# Patient Record
Sex: FEMALE | Race: WHITE | Employment: FULL TIME | URBAN - METROPOLITAN AREA
[De-identification: names, ages, dates, MRNs, and addresses within clinical notes are randomized per-mention and may not be internally consistent; named-entity substitution may affect disease eponyms.]

---

## 2020-09-10 ENCOUNTER — OFFICE VISIT (OUTPATIENT)
Dept: OBGYN CLINIC | Age: 30
End: 2020-09-10
Payer: COMMERCIAL

## 2020-09-10 VITALS
DIASTOLIC BLOOD PRESSURE: 79 MMHG | WEIGHT: 237 LBS | BODY MASS INDEX: 33.93 KG/M2 | SYSTOLIC BLOOD PRESSURE: 123 MMHG | HEIGHT: 70 IN

## 2020-09-10 DIAGNOSIS — Z01.419 ENCNTR FOR GYN EXAM (GENERAL) (ROUTINE) W/O ABN FINDINGS: Primary | ICD-10-CM

## 2020-09-10 DIAGNOSIS — Z11.51 SCREENING FOR HPV (HUMAN PAPILLOMAVIRUS): ICD-10-CM

## 2020-09-10 PROCEDURE — 99385 PREV VISIT NEW AGE 18-39: CPT | Performed by: OBSTETRICS & GYNECOLOGY

## 2020-09-10 NOTE — PROGRESS NOTES
Alexis Siddiqui is a [de-identified] ,  26 y.o. female [facility] whose No LMP recorded. (Menstrual status: IUD). was on  who presents for her annual checkup. She is having no significant problems. She would like STD testing today, denies symptoms. With regard to the Gardisil vaccine, she is older than the FDA approved age to receive it. Menstrual status:    Her periods are minimal to nonexistent in flow. She is using essentially none pads or tampons per day, minimal to none using Mirena. She denies dysmenorrhea. She reports no premenstrual symptoms. Contraception:    The current method of family planning is IUD and She declines contraception and counseling. Sexual history:    She  reports being sexually active and has had partner(s) who are Male. She reports using the following method of birth control/protection: I.U.D..    Medical conditions:    Since her last annual GYN exam about 3/21/2016 ago, she has not the following changes in her health history: none. Pap and Mammogram History:    Her most recent Pap smear was normal obtained 3/21/2016 year(s) ago. Hx of Leep 2009    The patient has never had a mammogram.    The patient does not have a family history of breast cancer. Past Medical History:   Diagnosis Date    Abnormal Pap smear of cervix 2009    + HPV and mild dysplasia. Tx w/ LEEP. q 6month pap x2 both negative.  Encounter for insertion of mirena IUD 04/08/2016 4/2011;Replaced 4/8/16    HPV vaccine counseling     Gardasil Series Completed     Routine Papanicolaou smear 3/21/16    Negative (no hpv)      Past Surgical History:   Procedure Laterality Date    HX GYN  4/2011    Mirena IUD Inserted     HX LEEP PROCEDURE  Summer 2009    Mild dysplasia and HPV +       Current Outpatient Medications   Medication Sig Dispense Refill    levonorgestrel (MIRENA) 20 mcg/24 hr (5 years) IUD 1 Each by IntraUTERine route once. Allergies: Patient has no known allergies. Social History     Socioeconomic History    Marital status:      Spouse name: Not on file    Number of children: Not on file    Years of education: Not on file    Highest education level: Not on file   Occupational History    Not on file   Social Needs    Financial resource strain: Not on file    Food insecurity     Worry: Not on file     Inability: Not on file    Transportation needs     Medical: Not on file     Non-medical: Not on file   Tobacco Use    Smoking status: Current Every Day Smoker     Packs/day: 0.50     Types: Cigarettes    Smokeless tobacco: Never Used   Substance and Sexual Activity    Alcohol use: Yes     Alcohol/week: 7.0 standard drinks     Types: 7 Standard drinks or equivalent per week     Comment: Drinks 1 drink w/dinner. And socially.  Drug use: No    Sexual activity: Yes     Partners: Male     Birth control/protection: I.U.D. Lifestyle    Physical activity     Days per week: Not on file     Minutes per session: Not on file    Stress: Not on file   Relationships    Social connections     Talks on phone: Not on file     Gets together: Not on file     Attends [employer] service: Not on file     Active member of club or organization: Not on file     Attends meetings of clubs or organizations: Not on file     Relationship status: Not on file    Intimate partner violence     Fear of current or ex partner: Not on file     Emotionally abused: Not on file     Physically abused: Not on file     Forced sexual activity: Not on file   Other Topics Concern    Not on file   Social History Narrative    Not on file     Tobacco History:  reports that she has been smoking cigarettes. She has been smoking about 0.50 packs per day. She has never used smokeless tobacco.  Alcohol Abuse:  reports current alcohol use of about 7.0 standard drinks of alcohol per week. Drug Abuse:  reports no history of drug use. There is no problem list on file for this patient.       Review of Systems - History obtained from the patient  Constitutional: negative for weight loss, fever, night sweats  HEENT: negative for hearing loss, earache, congestion, snoring, sorethroat  CV: negative for chest pain, palpitations, edema  Resp: negative for cough, shortness of breath, wheezing  GI: negative for change in bowel habits, abdominal pain, black or bloody stools  : negative for frequency, dysuria, hematuria, vaginal discharge  MSK: negative for back pain, joint pain, muscle pain  Breast: negative for breast lumps, nipple discharge, galactorrhea  Skin :negative for itching, rash, hives  Neuro: negative for dizziness, headache, confusion, weakness  Psych: negative for anxiety, depression, change in mood  Heme/lymph: negative for bleeding, bruising, pallor    Physical Exam    There were no vitals taken for this visit.     Constitutional  · Appearance: well-nourished, well developed, alert, in no acute distress    HENT  · Head and Face: appears normal    Neck  · Inspection/Palpation: normal appearance, no masses or tenderness  · Lymph Nodes: no lymphadenopathy present  · Thyroid: gland size normal, nontender, no nodules or masses present on palpation    Chest  · Respiratory Effort: breathing normal  · Auscultation: normal breath sounds    Cardiovascular  · Heart:  · Auscultation: regular rate and rhythm without murmur    Breasts  · Inspection of Breasts: breasts symmetrical, no skin changes, no discharge present, nipple appearance normal, no skin retraction present  · Palpation of Breasts and Axillae: no masses present on palpation, no breast tenderness  · Axillary Lymph Nodes: no lymphadenopathy present    Gastrointestinal  · Abdominal Examination: abdomen non-tender to palpation, normal bowel sounds, no masses present  · Liver and spleen: no hepatomegaly present, spleen not palpable  · Hernias: no hernias identified    Genitourinary  · External Genitalia: normal appearance for age, no discharge present, no tenderness present, no inflammatory lesions present, no masses present, no atrophy present  · Vagina: normal vaginal vault without central or paravaginal defects, no discharge present, no inflammatory lesions present, no masses present  · Bladder: non-tender to palpation  · Urethra: appears normal  · Cervix: normal, string seen   · Uterus: normal size, shape and consistency  · Adnexa: no adnexal tenderness present, no adnexal masses present  · Perineum: perineum within normal limits, no evidence of trauma, no rashes or skin lesions present  · Anus: anus within normal limits, no hemorrhoids present  · Inguinal Lymph Nodes: no lymphadenopathy present    Skin  · General Inspection: no rash, no lesions identified    Neurologic/Psychiatric  · Mental Status:  · Orientation: grossly oriented to person, place and time  · Mood and Affect: mood normal, affect appropriate    . Assessment:  Routine gynecologic examination  Her current medical status is satisfactory with no evidence of significant gynecologic issues.     Plan:  Counseled re: diet, exercise, healthy lifestyle  Return for yearly wellness visits  Pt counseled regarding co-testing for high risk HPV with pap  Switch out IUD next spring wants another Mirena  GCC, blood STD testing

## 2020-09-10 NOTE — PATIENT INSTRUCTIONS
Well Visit, Ages 25 to 48: Care Instructions Your Care Instructions Physical exams can help you stay healthy. Your doctor has checked your overall health and may have suggested ways to take good care of yourself. He or she also may have recommended tests. At home, you can help prevent illness with healthy eating, regular exercise, and other steps. Follow-up care is a key part of your treatment and safety. Be sure to make and go to all appointments, and call your doctor if you are having problems. It's also a good idea to know your test results and keep a list of the medicines you take. How can you care for yourself at home? · Reach and stay at a healthy weight. This will lower your risk for many problems, such as obesity, diabetes, heart disease, and high blood pressure. · Get at least 30 minutes of physical activity on most days of the week. Walking is a good choice. You also may want to do other activities, such as running, swimming, cycling, or playing tennis or team sports. Discuss any changes in your exercise program with your doctor. · Do not smoke or allow others to smoke around you. If you need help quitting, talk to your doctor about stop-smoking programs and medicines. These can increase your chances of quitting for good. · Talk to your doctor about whether you have any risk factors for sexually transmitted infections (STIs). Having one sex partner (who does not have STIs and does not have sex with anyone else) is a good way to avoid these infections. · Use birth control if you do not want to have children at this time. Talk with your doctor about the choices available and what might be best for you. · Protect your skin from too much sun. When you're outdoors from 10 a.m. to 4 p.m., stay in the shade or cover up with clothing and a hat with a wide brim. Wear sunglasses that block UV rays. Even when it's cloudy, put broad-spectrum sunscreen (SPF 30 or higher) on any exposed skin. · See a dentist one or two times a year for checkups and to have your teeth cleaned. · Wear a seat belt in the car. Follow your doctor's advice about when to have certain tests. These tests can spot problems early. For everyone · Cholesterol. Have the fat (cholesterol) in your blood tested after age 21. Your doctor will tell you how often to have this done based on your age, family history, or other things that can increase your risk for heart disease. · Blood pressure. Have your blood pressure checked during a routine doctor visit. Your doctor will tell you how often to check your blood pressure based on your age, your blood pressure results, and other factors. · Vision. Talk with your doctor about how often to have a glaucoma test. 
· Diabetes. Ask your doctor whether you should have tests for diabetes. · Colon cancer. Your risk for colorectal cancer gets higher as you get older. Some experts say that adults should start regular screening at age 48 and stop at age 76. Others say to start before age 48 or continue after age 76. Talk with your doctor about your risk and when to start and stop screening. For women · Breast exam and mammogram. Talk to your doctor about when you should have a clinical breast exam and a mammogram. Medical experts differ on whether and how often women under 50 should have these tests. Your doctor can help you decide what is right for you. · Cervical cancer screening test and pelvic exam. Begin with a Pap test at age 24. The test often is part of a pelvic exam. Starting at age 27, you may choose to have a Pap test, an HPV test, or both tests at the same time (called co-testing). Talk with your doctor about how often to have testing. · Tests for sexually transmitted infections (STIs). Ask whether you should have tests for STIs. You may be at risk if you have sex with more than one person, especially if your partners do not wear condoms. For men · Tests for sexually transmitted infections (STIs). Ask whether you should have tests for STIs. You may be at risk if you have sex with more than one person, especially if you do not wear a condom. · Testicular cancer exam. Ask your doctor whether you should check your testicles regularly. · Prostate exam. Talk to your doctor about whether you should have a blood test (called a PSA test) for prostate cancer. Experts differ on whether and when men should have this test. Some experts suggest it if you are older than 39 and are -American or have a father or brother who got prostate cancer when he was younger than 72. When should you call for help? Watch closely for changes in your health, and be sure to contact your doctor if you have any problems or symptoms that concern you. Where can you learn more? Go to http://cheikh-ju.info/ Enter P072 in the search box to learn more about \"Well Visit, Ages 25 to 48: Care Instructions. \" Current as of: May 27, 2020               Content Version: 12.6 © 7402-2587 [employer]. Care instructions adapted under license by [employer] (which disclaims liability or warranty for this information). If you have questions about a medical condition or this instruction, always ask your healthcare professional. [address] any warranty or liability for your use of this information.

## 2020-09-10 NOTE — PROGRESS NOTES
Anayeli Nazario is a [de-identified] ,  26 y.o. female [facility] [place] whose No LMP recorded. (Menstrual status: IUD). was on  who presents for her annual checkup. She is having {problem:53930}. With regard to the Gardisil vaccine, she is older than the FDA approved age to receive it. Menstrual status: 
 
Her periods are {Description; bleeding vaginal:53658::\"minimal to nonexistent\"} in flow. She is using {Number Text:81320} pads or tampons per day, {cycle:74453}. She denies dysmenorrhea. She reports no premenstrual symptoms. Contraception: The current method of family planning is {contraception:170855::\"She declines contraception and counseling\"}. Sexual history: She  reports being sexually active and has had partner(s) who are Male. She reports using the following method of birth control/protection: I.U.D.. Medical conditions: 
 
Since her last annual GYN exam about three or more years ago, she has not the following changes in her health history: none. Pap and Mammogram History: 
 
Her most recent Pap smear was 3/21/2016 neg The patient has never had a mammogram. 
 
The patient does not have a family history of breast cancer. Past Medical History:  
Diagnosis Date  Abnormal Pap smear of cervix 2009  
 + HPV and mild dysplasia. Tx w/ LEEP. q 6month pap x2 both negative.  Encounter for insertion of mirena IUD Inserted 4/2011 Replaced 4/8/16  HPV vaccine counseling Gardasil Series Completed  Routine Papanicolaou smear 3/21/16 Negative (no hpv) Past Surgical History:  
Procedure Laterality Date  HX GYN  4/2011 Mirena IUD Inserted  HX LEEP PROCEDURE  Summer 2009 Mild dysplasia and HPV + Current Outpatient Medications Medication Sig Dispense Refill  levonorgestrel (MIRENA) 20 mcg/24 hr (5 years) IUD 1 Each by IntraUTERine route once. Allergies: Patient has no known allergies. Social History Socioeconomic History  Marital status:  Spouse name: Not on file  Number of children: Not on file  Years of education: Not on file  Highest education level: Not on file Occupational History  Not on file Social Needs  Financial resource strain: Not on file  Food insecurity Worry: Not on file Inability: Not on file  Transportation needs Medical: Not on file Non-medical: Not on file Tobacco Use  Smoking status: Current Every Day Smoker Packs/day: 0.50 Types: Cigarettes  Smokeless tobacco: Never Used Substance and Sexual Activity  Alcohol use: Yes Alcohol/week: 7.0 standard drinks Types: 7 Standard drinks or equivalent per week Comment: Drinks 1 drink w/dinner. And socially.  Drug use: No  
 Sexual activity: Yes  
  Partners: Male Birth control/protection: I.U.D. Lifestyle  Physical activity Days per week: Not on file Minutes per session: Not on file  Stress: Not on file Relationships  Social connections Talks on phone: Not on file Gets together: Not on file Attends Caodaism service: Not on file Active member of club or organization: Not on file Attends meetings of clubs or organizations: Not on file Relationship status: Not on file  Intimate partner violence Fear of current or ex partner: Not on file Emotionally abused: Not on file Physically abused: Not on file Forced sexual activity: Not on file Other Topics Concern  Not on file Social History Narrative  Not on file Tobacco History:  reports that she has been smoking cigarettes. She has been smoking about 0.50 packs per day. She has never used smokeless tobacco. 
Alcohol Abuse:  reports current alcohol use of about 7.0 standard drinks of alcohol per week. Drug Abuse:  reports no history of drug use. There is no problem list on file for this patient. Review of Systems - History obtained from the patient Constitutional: negative for weight loss, fever, night sweats HEENT: negative for hearing loss, earache, congestion, snoring, sorethroat CV: negative for chest pain, palpitations, edema Resp: negative for cough, shortness of breath, wheezing GI: negative for change in bowel habits, abdominal pain, black or bloody stools : negative for frequency, dysuria, hematuria, vaginal discharge MSK: negative for back pain, joint pain, muscle pain Breast: negative for breast lumps, nipple discharge, galactorrhea Skin :negative for itching, rash, hives Neuro: negative for dizziness, headache, confusion, weakness Psych: negative for anxiety, depression, change in mood Heme/lymph: negative for bleeding, bruising, pallor Physical Exam 
 
There were no vitals taken for this visit. Constitutional 
· Appearance: well-nourished, well developed, alert, in no acute distress HENT 
· Head and Face: appears normal 
 
Neck · Inspection/Palpation: normal appearance, no masses or tenderness · Lymph Nodes: no lymphadenopathy present · Thyroid: gland size normal, nontender, no nodules or masses present on palpation Chest 
· Respiratory Effort: breathing normal 
· Auscultation: normal breath sounds Cardiovascular · Heart: 
· Auscultation: regular rate and rhythm without murmur Breasts · Inspection of Breasts: breasts symmetrical, no skin changes, no discharge present, nipple appearance normal, no skin retraction present · Palpation of Breasts and Axillae: no masses present on palpation, no breast tenderness · Axillary Lymph Nodes: no lymphadenopathy present Gastrointestinal 
· Abdominal Examination: abdomen non-tender to palpation, normal bowel sounds, no masses present · Liver and spleen: no hepatomegaly present, spleen not palpable · Hernias: no hernias identified Genitourinary · External Genitalia: normal appearance for age, no discharge present, no tenderness present, no inflammatory lesions present, no masses present, no atrophy present · Vagina: normal vaginal vault without central or paravaginal defects, no discharge present, no inflammatory lesions present, no masses present · Bladder: non-tender to palpation · Urethra: appears normal 
· Cervix: normal  
· Uterus: normal size, shape and consistency · Adnexa: no adnexal tenderness present, no adnexal masses present · Perineum: perineum within normal limits, no evidence of trauma, no rashes or skin lesions present · Anus: anus within normal limits, no hemorrhoids present · Inguinal Lymph Nodes: no lymphadenopathy present Skin · General Inspection: no rash, no lesions identified Neurologic/Psychiatric · Mental Status: · Orientation: grossly oriented to person, place and time · Mood and Affect: mood normal, affect appropriate [facility]: 
Routine gynecologic examination Her current medical status is satisfactory with no evidence of significant gynecologic issues. Plan: 
Counseled re: diet, exercise, healthy lifestyle Return for yearly wellness visits Gardisil counseling provided Pt counseled regarding co-testing for high risk HPV with pap Rec screening mammo

## 2020-09-12 LAB
COMMENT, 144067: NORMAL
HBV SURFACE AG SERPL QL IA: NEGATIVE
HCV AB S/CO SERPL IA: <0.1 S/CO RATIO (ref 0–0.9)
HIV 1+2 AB+HIV1 P24 AG SERPL QL IA: NON REACTIVE
HSV2 IGG SER IA-ACNC: <0.91 INDEX (ref 0–0.9)
TREPONEMA PALLIDUM IGG+IGM AB [PRESENCE] IN SERUM OR PLASMA BY IMMUNOASSAY: NON REACTIVE

## 2020-09-13 LAB
C TRACH RRNA SPEC QL NAA+PROBE: NEGATIVE
N GONORRHOEA RRNA SPEC QL NAA+PROBE: NEGATIVE
T VAGINALIS DNA SPEC QL NAA+PROBE: NEGATIVE

## 2020-09-25 LAB
CYTOLOGIST CVX/VAG CYTO: NORMAL
CYTOLOGY CVX/VAG DOC CYTO: NORMAL
CYTOLOGY CVX/VAG DOC THIN PREP: NORMAL
CYTOLOGY HISTORY:: NORMAL
DX ICD CODE: NORMAL
HPV I/H RISK 1 DNA CVX QL PROBE+SIG AMP: NEGATIVE
Lab: NORMAL
OTHER STN SPEC: NORMAL
STAT OF ADQ CVX/VAG CYTO-IMP: NORMAL

## 2021-09-13 ENCOUNTER — OFFICE VISIT (OUTPATIENT)
Dept: OBGYN CLINIC | Age: 31
End: 2021-09-13
Payer: COMMERCIAL

## 2021-09-13 VITALS
BODY MASS INDEX: 29.56 KG/M2 | WEIGHT: 206 LBS | SYSTOLIC BLOOD PRESSURE: 117 MMHG | HEART RATE: 58 BPM | DIASTOLIC BLOOD PRESSURE: 67 MMHG

## 2021-09-13 DIAGNOSIS — Z01.419 ENCOUNTER FOR GYNECOLOGICAL EXAMINATION: Primary | ICD-10-CM

## 2021-09-13 PROCEDURE — 99395 PREV VISIT EST AGE 18-39: CPT | Performed by: OBSTETRICS & GYNECOLOGY

## 2021-09-13 NOTE — PROGRESS NOTES
lAnnual exam ages 21-44    [address] is a ,  33 y.o. female WHITE/NON- No LMP recorded. (Menstrual status: IUD). , who presents for her annual checkup. Mirena IUD replaced 16. Saw Dr. Tracey Trinh . Last saw me in . Bought a horse. Since her last annual GYN exam about one year ago, she has had the following changes in her health history:   - has lost about 50# from last year. Eating better, exercising    Will probably try to get pregnant in next 2yrs. ADDITIONAL CONCERNS:  She is having no significant problems. She would like to discuss getting her Mirena IUD replaced; inserted 2016. With regard to the Gardasil vaccine, she has not received it yet. Menstrual status:    Her periods are minimal to none in flow with IUD. She denies dysmenorrhea. She has premenstrual symptoms; bloating. Contraception:    The current method of family planning is IUD. Sexual history:     She  reports being sexually active and has had partner(s) who are Male. She reports using the following method of birth control/protection: I.U.D.. [facility] Pap and Mammogram History:    Her most recent Pap smear was normal, HPV was negative, obtained 2020. She does have a history of abnormal pap in . The patient has never had a mammogram.    Breast Cancer History/Substance Abuse:    Past Medical History:   Diagnosis Date    Abnormal Pap smear of cervix     + HPV and mild dysplasia. Tx w/ LEEP. q 6month pap x2 both negative.     Encounter for insertion of mirena IUD 2016;Replaced 16    HPV vaccine counseling     Gardasil Series Completed     Routine Papanicolaou smear 3/21/16    Negative (no hpv)      Past Surgical History:   Procedure Laterality Date    HX GYN  2011    Mirena IUD Inserted     HX LEEP PROCEDURE  Summer 2009    Mild dysplasia and HPV +     OB History    Para Term  AB Living   0 0 0 0 0 0   SAB TAB Ectopic Molar Multiple Live Births   0 0 0   0         Current Outpatient Medications   Medication Sig Dispense Refill    levonorgestrel (MIRENA) 20 mcg/24 hr (5 years) IUD 1 Each by IntraUTERine route once. Allergies: Patient has no known allergies. Social History     Socioeconomic History    Marital status:      Spouse name: Not on file    Number of children: Not on file    Years of education: Not on file    Highest education level: Not on file   Occupational History    Not on file   Tobacco Use    Smoking status: Former Smoker     Packs/day: 0.00     Types: Cigarettes    Smokeless tobacco: Never Used   Vaping Use    Vaping Use: Every day    Substances: Nicotine   Substance and Sexual Activity    Alcohol use: Yes     Alcohol/week: 7.0 standard drinks     Types: 7 Standard drinks or equivalent per week     Comment: Drinks 1 drink w/dinner. And socially.  Drug use: Yes     Types: Marijuana    Sexual activity: Yes     Partners: Male     Birth control/protection: I.U.D. Other Topics Concern    Not on file   Social History Narrative    Not on file     Social Determinants of Health     Financial Resource Strain:     Difficulty of Paying Living Expenses:    Food Insecurity:     Worried About Running Out of Food in the Last Year:     [address] in the Last Year:    Transportation Needs:     Lack of Transportation (Medical):      Lack of Transportation (Non-Medical):    Physical Activity:     Days of Exercise per Week:     Minutes of Exercise per Session:    Stress:     Feeling of Stress :    Social Connections:     Frequency of Communication with Friends and Family:     Frequency of Social Gatherings with Friends and Family:     Attends Alevism Services:     Active Member of Clubs or Organizations:     Attends Club or Organization Meetings:     Marital Status:    Intimate Partner Violence:     Fear of Current or Ex-Partner:     Emotionally Abused:     Physically Abused:     Sexually Abused: Tobacco History:  reports that she has quit smoking. Her smoking use included cigarettes. She smoked 0.00 packs per day. She has never used smokeless tobacco.  Alcohol Abuse:  reports current alcohol use of about 7.0 standard drinks of alcohol per week. Drug Abuse:  reports current drug use. Drug: Marijuana. There is no problem list on file for this patient.     Family History   Problem Relation Age of Onset    Diabetes Father     Hypertension Father     Heart Attack Father         Fatal        Review of Systems - History obtained from the patient  Constitutional: negative for weight loss, fever, night sweats  HEENT: negative for hearing loss, earache, congestion, snoring, sorethroat  CV: negative for chest pain, palpitations, edema  Resp: negative for cough, shortness of breath, wheezing  GI: negative for change in bowel habits, abdominal pain, black or bloody stools  : negative for frequency, dysuria, hematuria, vaginal discharge  MSK: negative for back pain, joint pain, muscle pain  Breast: negative for breast lumps, nipple discharge, galactorrhea  Skin :negative for itching, rash, hives  Neuro: negative for dizziness, headache, confusion, weakness  Psych: negative for anxiety, depression, change in mood  Heme/lymph: negative for bleeding, bruising, pallor    Physical Exam    Visit Vitals  /67   Pulse (!) 58   Wt 206 lb (93.4 kg)   BMI 29.56 kg/m²       Constitutional  · Appearance: well-nourished, well developed, alert, in no acute distress    HENT  · Head and Face: appears normal    Neck  · Inspection/Palpation: normal appearance, no masses or tenderness  · Lymph Nodes: no lymphadenopathy present  · Thyroid: gland size normal, nontender, no nodules or masses present on palpation    Chest  · Respiratory Effort: breathing unlabored  · Auscultation: normal breath sounds    Cardiovascular  · Heart:  · Auscultation: regular rate and rhythm without murmur    Breasts  · Inspection of Breasts: breasts symmetrical, no skin changes, no discharge present, nipple appearance normal, no skin retraction present  · Palpation of Breasts and Axillae: no masses present on palpation, no breast tenderness  · Axillary Lymph Nodes: no lymphadenopathy present    Gastrointestinal  · Abdominal Examination: abdomen non-tender to palpation, normal bowel sounds, no masses present  · Liver and spleen: no hepatomegaly present, spleen not palpable  · Hernias: no hernias identified    Genitourinary  · External Genitalia: normal appearance for age, no discharge present, no tenderness present, no inflammatory lesions present, no masses present, no atrophy present  · Vagina: normal vaginal vault without central or paravaginal defects, no discharge present, no inflammatory lesions present, no masses present  · Bladder: non-tender to palpation  · Urethra: appears normal  · Cervix: normal; IUD strings ~3cm  · Uterus: normal size, shape and consistency  · Adnexa: no adnexal tenderness present, no adnexal masses present  · Perineum: perineum within normal limits, no evidence of trauma, no rashes or skin lesions present  · Anus: anus within normal limits, no hemorrhoids present  · Inguinal Lymph Nodes: no lymphadenopathy present    Skin  · General Inspection: no rash, no lesions identified    Neurologic/Psychiatric  · Mental Status:  · Orientation: grossly oriented to person, place and time  · Mood and Affect: mood normal, affect appropriate            Assessment & Plan:  · Routine gynecologic examination. Pap/HPV neg 9/10/20  · Remote h/o abnl pap, tx'd with LEEP. Has returned to routine screening. · Her current medical status is satisfactory with no evidence of significant gynecologic issues. · Mirena IUD 4/8/16. Adv now approved for 7yrs. · May consider pregnancy in next couple of years. Preconceptual counseling. Rec MVI/PNV/folate/DHA. Healthy life style, avoid T/E/D. Up to date with vaccinations.   Address any dental issues prior to pregnancy.   · Counseled re: diet, exercise, healthy lifestyle  · Return for yearly wellness visits  · Pt counseled regarding co-testing for high risk HPV with pap  · Patient verbalized understanding

## 2022-04-27 NOTE — PROGRESS NOTES
[facility]  OFFICE PROCEDURE PROGRESS NOTE        Chart reviewed for the following:   Miles BISWAS RN, have reviewed the History, Physical and updated the Allergic reactions for Judson Mccall performed immediately prior to start of procedure:   Les Patterson RN, have performed the following reviews on Glorious Handing prior to the start of the procedure:            * Patient was identified by name and date of birth   * Agreement on procedure being performed was verified  * Risks and Benefits explained to the patient  * Procedure site verified and marked as necessary  * Patient was positioned for comfort  * Consent was signed and verified     Time:       Date of procedure: 2022    Procedure performed by:  Isabel Cain MD    Provider assisted by: Michel Kelsey RN    Patient assisted by: self    How tolerated by patient: tolerated the procedure well with no complications    Post Procedural Pain Scale: 2 - Hurts Little Bit    Comments: none      IUD REMOVAL    Indications for Removal:  Judson Burrell is a ,  33 y.o. female [address] [facility] [place] whose No LMP recorded. (Menstrual status: IUD). was on . who presents today for IUD removal.  Her current IUD was placed 16. She has not had any problems with the IUD. She requests removal of the IUD because she would like to get pregnant. Did have heavy cycles prior to IUD. Advised that she will likely return to this. The IUD removal procedure was discussed with the patient and she had no further questions. Procedure: The patient was placed in a dorsal lithotomy position. A speculum exam was performed and the cervix was visualized. The cervix was prepped with zephiran solution. The IUD string was visualized. Using ring forceps , the string was grasped and the IUD removed intact. The IUD was shown to the patient.      Is taking PNV, advised to continue  Has seen dentist and had necessary work done  Advised to see PCP make sure UTD with routine care/vaccines  Menstrual calendar given. Briefly discussed OPK.

## 2022-04-28 ENCOUNTER — OFFICE VISIT (OUTPATIENT)
Dept: OBGYN CLINIC | Age: 32
End: 2022-04-28
Payer: COMMERCIAL

## 2022-04-28 VITALS
BODY MASS INDEX: 29.29 KG/M2 | SYSTOLIC BLOOD PRESSURE: 113 MMHG | DIASTOLIC BLOOD PRESSURE: 76 MMHG | HEIGHT: 70 IN | WEIGHT: 204.6 LBS

## 2022-04-28 DIAGNOSIS — Z30.432 ENCOUNTER FOR IUD REMOVAL: Primary | ICD-10-CM

## 2022-04-28 PROCEDURE — 58301 REMOVE INTRAUTERINE DEVICE: CPT | Performed by: OBSTETRICS & GYNECOLOGY

## 2022-09-16 NOTE — PROGRESS NOTES
Annual exam ages 21-44    [address] is a ,  27 y.o. female [address] [facility] [place] Patient's last menstrual period was 2022., who presents for her annual checkup. Since her last annual GYN exam about one year ago, she has had the following changes in her health history:   - Mirena removed     Trying to get pregnant. Taking PNV. Saw PCP earlier in the year. Had titers check, got MMR. Up to date with dental care. Periods regular, but ?shorter luteal phase. Has been tracking cycles. Reports ovulation ~CD#15-16, luteal phase usually about 10 days. ADDITIONAL CONCERNS:  She is having no significant problems. With regard to the Gardasil vaccine, she has received all 3 injections. Menstrual status:    Her periods are light in flow. She is using one to two pads or tampons per day, usually regular and last 25-27 days. She denies dysmenorrhea. She denies premenstrual symptoms. Contraception:    The current method of family planning is none. Sexual history:     She  has no history on file for sexual activity. .        Pap and Mammogram History:    Her most recent Pap smear was normal, HPV was neg, obtained 2020. She does have a history of abnormal paps. Pap    + HPV and mild dysplasia. Tx w/ LEEP. The patient has never had a mammogram.    Breast Cancer History/Substance Abuse:    Past Medical History:   Diagnosis Date    Abnormal Pap smear of cervix 2009    + HPV and mild dysplasia. Tx w/ LEEP. q 6month pap x2 both negative. Encounter for insertion of mirena IUD 2016;Replaced 14 -> removed 22.     HPV vaccine counseling     Gardasil Series Completed     Routine Papanicolaou smear 3/21/16    Negative (no hpv)      Past Surgical History:   Procedure Laterality Date    HX GYN  2011    Mirena IUD Inserted     HX LEEP PROCEDURE  Summer 2009    Mild dysplasia and HPV +     OB History    Para Term  AB Living   0 0 0 0 0 0 SAB IAB Ectopic Molar Multiple Live Births   0 0 0   0         Current Outpatient Medications   Medication Sig Dispense Refill    prenatal multivit-ca-min-fe-fa tab Take  by mouth.      loratadine 10 mg cap Take  by mouth. Allergies: Patient has no known allergies. Social History     Socioeconomic History    Marital status:      Spouse name: Not on file    Number of children: Not on file    Years of education: Not on file    Highest education level: Not on file   Occupational History    Not on file   Tobacco Use    Smoking status: Former     Packs/day: 0.00     Types: Cigarettes    Smokeless tobacco: Never   Vaping Use    Vaping Use: Former    Substances: Nicotine   Substance and Sexual Activity    Alcohol use: Yes     Alcohol/week: 7.0 standard drinks     Types: 7 Standard drinks or equivalent per week     Comment: Drinks 1 drink w/dinner. And socially. Drug use: Not Currently     Types: Marijuana    Sexual activity: Not on file   Other Topics Concern    Not on file   Social History Narrative    Not on file     Social Determinants of Health     Financial Resource Strain: Not on file   Food Insecurity: Not on file   Transportation Needs: Not on file   Physical Activity: Not on file   Stress: Not on file   Social Connections: Not on file   Intimate Partner Violence: Not on file   Housing Stability: Not on file     Tobacco History:  reports that she has quit smoking. Her smoking use included cigarettes. She has never used smokeless tobacco.  Alcohol Abuse:  reports current alcohol use of about 7.0 standard drinks per week. Drug Abuse:  reports that she does not currently use drugs after having used the following drugs: Marijuana. There is no problem list on file for this patient.     Family History   Problem Relation Age of Onset    Diabetes Father     Hypertension Father     Heart Attack Father         Fatal        Review of Systems - History obtained from the patient  Constitutional: negative for weight loss, fever, night sweats  HEENT: negative for hearing loss, earache, congestion, snoring, sorethroat  CV: negative for chest pain, palpitations, edema  Resp: negative for cough, shortness of breath, wheezing  GI: negative for change in bowel habits, abdominal pain, black or bloody stools  : negative for frequency, dysuria, hematuria, vaginal discharge  MSK: negative for back pain, joint pain, muscle pain  Breast: negative for breast lumps, nipple discharge, galactorrhea  Skin :negative for itching, rash, hives  Neuro: negative for dizziness, headache, confusion, weakness  Psych: negative for anxiety, depression, change in mood  Heme/lymph: negative for bleeding, bruising, pallor    Physical Exam    Visit Vitals  /76   Pulse 67   Ht 5' 10\" (1.778 m)   Wt 209 lb (94.8 kg)   LMP 08/26/2022   BMI 29.99 kg/m²       Constitutional  Appearance: well-nourished, well developed, alert, in no acute distress    HENT  Head and Face: appears normal    Neck  Inspection/Palpation: normal appearance, no masses or tenderness  Lymph Nodes: no lymphadenopathy present  Thyroid: gland size normal, nontender, no nodules or masses present on palpation    Chest  Respiratory Effort: breathing unlabored  Auscultation: normal breath sounds    Cardiovascular  Heart:   Auscultation: regular rate and rhythm without murmur    Breasts  Inspection of Breasts: breasts symmetrical, no skin changes, no discharge present, nipple appearance normal, no skin retraction present  Palpation of Breasts and Axillae: no masses present on palpation, no breast tenderness  Axillary Lymph Nodes: no lymphadenopathy present    Gastrointestinal  Abdominal Examination: abdomen non-tender to palpation, normal bowel sounds, no masses present  Liver and spleen: no hepatomegaly present, spleen not palpable  Hernias: no hernias identified    Genitourinary  External Genitalia: normal appearance for age, no discharge present, no tenderness present, no inflammatory lesions present, no masses present, no atrophy present  Vagina: normal vaginal vault without central or paravaginal defects, no discharge present, no inflammatory lesions present, no masses present  Bladder: non-tender to palpation  Urethra: appears normal  Cervix: normal   Uterus: normal size, shape and consistency  Adnexa: no adnexal tenderness present, no adnexal masses present  Perineum: perineum within normal limits, no evidence of trauma, no rashes or skin lesions present  Anus: anus within normal limits, no hemorrhoids present  Inguinal Lymph Nodes: no lymphadenopathy present    Skin  General Inspection: no rash, no lesions identified    Neurologic/Psychiatric  Mental Status:  Orientation: grossly oriented to person, place and time  Mood and Affect: mood normal, affect appropriate      Assessment & Plan:  Routine gynecologic examination. Pap/HPV neg 9/10/20  Her current medical status is satisfactory with no evidence of significant gynecologic issues. Desires pregnancy. Tracking cycles. Menstrual calendar given. Discussed checking postovulatory progesterone, semen analysis (packet given).   Return for yearly wellness visits  Patient verbalized understanding

## 2022-09-19 ENCOUNTER — OFFICE VISIT (OUTPATIENT)
Dept: OBGYN CLINIC | Age: 32
End: 2022-09-19
Payer: COMMERCIAL

## 2022-09-19 VITALS
SYSTOLIC BLOOD PRESSURE: 123 MMHG | BODY MASS INDEX: 29.92 KG/M2 | HEIGHT: 70 IN | WEIGHT: 209 LBS | DIASTOLIC BLOOD PRESSURE: 76 MMHG | HEART RATE: 67 BPM

## 2022-09-19 DIAGNOSIS — Z01.419 ENCOUNTER FOR GYNECOLOGICAL EXAMINATION: Primary | ICD-10-CM

## 2022-09-19 PROCEDURE — 99395 PREV VISIT EST AGE 18-39: CPT | Performed by: OBSTETRICS & GYNECOLOGY

## 2022-10-24 ENCOUNTER — ROUTINE PRENATAL (OUTPATIENT)
Dept: OBGYN CLINIC | Age: 32
End: 2022-10-24

## 2022-10-24 VITALS
SYSTOLIC BLOOD PRESSURE: 98 MMHG | HEART RATE: 55 BPM | WEIGHT: 210 LBS | BODY MASS INDEX: 30.13 KG/M2 | DIASTOLIC BLOOD PRESSURE: 65 MMHG

## 2022-10-24 DIAGNOSIS — E55.9 VITAMIN D DEFICIENCY: ICD-10-CM

## 2022-10-24 DIAGNOSIS — O34.40 HISTORY OF LOOP ELECTROSURGICAL EXCISION PROCEDURE (LEEP) OF CERVIX AFFECTING PREGNANCY, ANTEPARTUM: ICD-10-CM

## 2022-10-24 DIAGNOSIS — Z98.890 HISTORY OF LOOP ELECTROSURGICAL EXCISION PROCEDURE (LEEP) OF CERVIX AFFECTING PREGNANCY, ANTEPARTUM: ICD-10-CM

## 2022-10-24 DIAGNOSIS — Z34.00 SUPERVISION OF NORMAL FIRST PREGNANCY, ANTEPARTUM: ICD-10-CM

## 2022-10-24 DIAGNOSIS — N92.6 MISSED MENSES: Primary | ICD-10-CM

## 2022-10-24 DIAGNOSIS — Z11.3 SCREEN FOR STD (SEXUALLY TRANSMITTED DISEASE): ICD-10-CM

## 2022-10-24 PROCEDURE — 0501F PRENATAL FLOW SHEET: CPT | Performed by: OBSTETRICS & GYNECOLOGY

## 2022-10-24 RX ORDER — PNV 85/IRON/FOLIC/DHA/FISH OIL 40-10-1 MG
1 CAPSULE ORAL DAILY
Qty: 90 CAPSULE | Refills: 4 | Status: SHIPPED | OUTPATIENT
Start: 2022-10-24

## 2022-10-24 RX ORDER — ONDANSETRON 4 MG/1
4 TABLET, ORALLY DISINTEGRATING ORAL
Qty: 30 TABLET | Refills: 2 | Status: SHIPPED | OUTPATIENT
Start: 2022-10-24 | End: 2022-11-21 | Stop reason: ALTCHOICE

## 2022-10-24 RX ORDER — PROMETHAZINE HYDROCHLORIDE 25 MG/1
25 TABLET ORAL
Qty: 30 TABLET | Refills: 1 | Status: SHIPPED | OUTPATIENT
Start: 2022-10-24

## 2022-10-24 RX ORDER — DOXYLAMINE SUCCINATE AND PYRIDOXINE HYDROCHLORIDE, DELAYED RELEASE TABLETS 10 MG/10 MG 10; 10 MG/1; MG/1
TABLET, DELAYED RELEASE ORAL
Qty: 120 TABLET | Refills: 2 | Status: SHIPPED | OUTPATIENT
Start: 2022-10-24

## 2022-10-24 NOTE — PROGRESS NOTES
Current pregnancy history:    Saji López is a 27 y.o. female who presents for the evaluation of pregnancy. Patient's last menstrual period was 2022. LMP history:  The date of her LMP is certain. Her menstrual cycles are regular and occur approximately every 28 days and range from 3 to 5 days. The last menses did last the usual number of days. A urine pregnancy test was positive 5 weeks ago. She was not on the pill at conception. Based on her LMP her EGA is 8 weeks and 3 days giving an [address] of 23. Ultrasound data:  She had an ultrasound done by the ultrasound tech today which revealed a viable brown pregnancy with a gestational age of 11 weeks and 4 days giving an [address] of 23. Ultrasound details:    TA ULTRASOUND PERFORMED  A SINGLE VIABLE 8W4D IUP IS SEEN WITH NORMAL CARDIAC RHYTHM. GESTATIONAL AGE BASED ON TODAYS ULTRASOUND. A NORMAL YOLK SAC IS SEEN. RIGHT OVARY APPEARS WITHIN NORMAL LIMITS. LEFT OVARY APPEARS WITHIN NORMAL LIMITS. NO FREE FLUID SEEN IN THE CDS. Pregnancy symptoms:    Since her LMP she has experienced  urinary frequency, breast tenderness, fatigue and nausea. She has not been vomiting over the last few weeks. Associated signs and symptoms which she denies: dysuria, discharge, vaginal bleeding. She states she has maintained weight. (209# around conception)    Relevant past pregnancy history:  She has the following pregnancy history: none      Relevant past medical history:(relevant to this pregnancy):   - h/o LEEP     Pap/Occupational history:  Last pap smear: 2020  Results: normal/HPV neg    Her occupation is: . Substance history:  Negative for alcohol, tobacco and street drugs. +EtOH and vaping prior to +pregnancy test.  Exposure history: There is an indoor cat in the home. No litter box  The patient was instructed to not change the cat litter. She denies close contact with children on a regular basis.    She has had chicken pox or the vaccine in the past.   Patient denies issues with domestic violence. Genetic Screening/Teratology Counseling: (Includes patient, baby's father, or anyone in either family with:)  3.  Patient's age >/= [address] at [facility]?-- no  .   2. Thalassemia ([facility] [place], [address], or  background): MCV<80?--no.     3.  Neural tube defect (meningomyelocele, spina bifida, anencephaly)?--no.   4.  Congenital heart defect?--no.  5.  Down syndrome?--no.   6.  Efrem-Sachs ([place])?--no.   7.  Canavan's Disease?--no.   8.  Familial Dysautonomia?--no.   9.  Sickle cell disease or trait ()? --no   The patient has not been tested for sickle trait  10. Hemophilia or other blood disorders?--no. 11.  Muscular dystrophy?--no. 12.  Cystic fibrosis?--no. 13.  Barton's Chorea?--no. 14.  Mental retardation/autism (if yes was person tested for Fragile X)?--no. 15.  Other inherited genetic or chromosomal disorder?--no. 12.  Maternal metabolic disorder (DM, PKU, etc)?--no. 17.  Patient or FOB with a child with a birth defect not listed above?--no.  17a. Patient or FOB with a birth defect themselves?--no. 18.  Recurrent pregnancy loss, or stillbirth?--no. 19.  Any medications since LMP other than prenatal vitamins (include vitamins,  supplements, OTC meds, drugs, alcohol)? -- EtOH, vaping  20. Any other genetic/environmental exposure to discuss?--no. Infection History:  1. Lives with someone with TB or TB exposed?--no.   2.  Patient or partner has history of genital herpes?--no.  3.  Rash or viral illness since LMP?--no.    4.  History of STD (GC, CT, HPV, syphilis, HIV)?-- ?HPV  5. Other: OTHER? Past Medical History:   Diagnosis Date    Abnormal Pap smear of cervix 2009    + HPV and mild dysplasia. Tx w/ LEEP. q 6month pap x2 both negative. Encounter for insertion of mirena IUD 04/08/2016 4/2011;Replaced 4/8/16 -> removed 4/28/22.     HPV vaccine counseling Gardasil Series Completed     Routine Papanicolaou smear 3/21/16    Negative (no hpv)      Past Surgical History:   Procedure Laterality Date    HX GYN  4/2011    Mirena IUD Inserted     HX LEEP PROCEDURE  Summer 2009    Mild dysplasia and HPV +     Social History     Occupational History    Not on file   Tobacco Use    Smoking status: Former     Packs/day: 0.00     Types: Cigarettes    Smokeless tobacco: Never   Vaping Use    Vaping Use: Former    Substances: Nicotine   Substance and Sexual Activity    Alcohol use: Yes     Alcohol/week: 7.0 standard drinks     Types: 7 Standard drinks or equivalent per week     Comment: Drinks 1 drink w/dinner. And socially. Drug use: Not Currently     Types: Marijuana    Sexual activity: Not on file     Family History   Problem Relation Age of Onset    Diabetes Father     Hypertension Father     Heart Attack Father         Fatal        No Known Allergies  Prior to Admission medications    Medication Sig Start Date End Date Taking? Authorizing Provider   acetaminophen (TYLENOL PO) Take  by mouth. Yes Provider, Historical   prenatal multivit-ca-min-fe-fa tab Take  by mouth. Yes Provider, Historical   loratadine 10 mg cap Take  by mouth.   Patient not taking: Reported on 10/24/2022    Provider, Historical        Review of Systems: History obtained from the patient  Constitutional: negative for weight loss, fever, night sweats  HEENT: negative for hearing loss, earache, congestion, snoring, sorethroat  CV: negative for chest pain, palpitations, edema  Resp: negative for cough, shortness of breath, wheezing  Breast: negative for breast lumps, nipple discharge, galactorrhea  GI: negative for change in bowel habits, abdominal pain, black or bloody stools  : negative for frequency, dysuria, hematuria, vaginal discharge  MSK: negative for back pain, joint pain, muscle pain  Skin: negative for itching, rash, hives  Neuro: negative for dizziness, headache, confusion, weakness  Psych: negative for anxiety, depression, change in mood  Heme/lymph: negative for bleeding, bruising, pallor    Objective:  Visit Vitals  BP 98/65   Pulse (!) 55   Wt 210 lb (95.3 kg)   LMP 08/26/2022   BMI 30.13 kg/m²       Physical Exam:   PHYSICAL EXAMINATION    Constitutional  Appearance: well-nourished, well developed, alert, in no acute distress    HENT  Head  Face: appears normal  Eyes: appear normal  Ears: normal  Mouth: normal  Lips: no lesions    Neck  Inspection/Palpation: normal appearance, no masses or tenderness  Lymph Nodes: no lymphadenopathy present  Thyroid: gland size normal, nontender, no nodules or masses present on palpation    Chest  Respiratory Effort: breathing unlabored  Auscultation: normal breath sounds    Cardiovascular  Heart:   Auscultation: regular rate and rhythm without murmur    Breasts  Inspection of Breasts: breasts symmetrical, no skin changes, no discharge present, nipple appearance normal, no skin retraction present  Palpation of Breasts and Axillae: no masses present on palpation, no breast tenderness  Axillary Lymph Nodes: no lymphadenopathy present    Gastrointestinal  Abdominal Examination: abdomen non-tender to palpation, normal bowel sounds, no masses present  Liver and spleen: no hepatomegaly present, spleen not palpable  Hernias: no hernias identified    Genitourinary  External Genitalia: normal appearance for age, no discharge present, no tenderness present, no inflammatory lesions present, no masses present, no atrophy present  Vagina: normal vaginal vault without central or paravaginal defects, no discharge present, no inflammatory lesions present, no masses present  Bladder: non-tender to palpation  Urethra: appears normal  Cervix: normal   Uterus: enlarged 8-10wks, normal shape, soft  Adnexa: no adnexal tenderness present, no adnexal masses present  Perineum: perineum within normal limits, no evidence of trauma, no rashes or skin lesions present  Anus: anus within normal limits, no hemorrhoids present  Inguinal Lymph Nodes: no lymphadenopathy present    Skin  General Inspection: no rash, no lesions identified    Neurologic/Psychiatric  Mental Status:  Orientation: grossly oriented to person, place and time  Mood and Affect: mood normal, affect appropriate    Assessment:   Intrauterine pregnancy:  - U=D. LMP for datin22 -> EDD23  - h/o LEEP -> MFM for cervical length  - ASA after 12-14wks (P0, BMI)  - concerns about Vit D def -> will draw today  - planning panorama, horizon. Will schedule lab appt ~3wks  - some nausea -> eRX phenergan, zofran, diclegis  BEAU 4wks          Plan:     Offered CF testing, CVS, Nuchal Translucency, MSAFP, amnio, and discussed NIPT  Course of pregnancy discussed including visit schedule, routine U/S, glucola testing, etc.  Avoid alcoholic beverages and illicit/recreational drugs use  Take prenatal vitamins or folic acid daily. Hospital and practice style discussed with coverage system. Discussed nutrition, toxoplasmosis precautions, sexual activity, exercise, need for influenza vaccine, environmental and work hazards, travel advice, screen for domestic violence, need for seat belts. Discussed seafood, unpasteurized dairy products, deli meat, artificial sweeteners, and caffeine. Information on prenatal classes/breastfeeding given. Patient encouraged not to smoke. Discussed current prescription drug use. Given medication list.  Discussed the use of over the counter medications and chemicals. Pt understands risk of hemorrhage during pregnancy and post delivery and would accept blood products if necessary in life-threatening emergencies      Handouts given to pt.       Orders Placed This Encounter    CULTURE, URINE    CT/NG/T.VAGINALIS AMPLIFICATION     Order Specific Question:   Specimen source     Answer:   Vagina [[id number]]    VITAMIN D, 25 HYDROXY     Standing Status:   Future     Number of Occurrences:   1     Standing Expiration Date:   10/24/2023    HEP B SURFACE AG    HIV SCREEN, 4TH GEN. W/REFLEX CONFIRM    CBC W/O DIFF    RUBELLA AB, IGG    RPR    HEPATITIS C AB    FERRITIN    REFERRAL TO MATERNAL FETAL MEDICINE     Referral Priority:   Routine     Referral Type:   Consultation     Referral Reason:   Specialty Services Required     Referred to Provider:   Jersey Youngblood MD     Requested Specialty:   Maternal Fetal Medicine Physician     Number of Visits Requested:   1    TYPE & SCREEN     ENTER SURGERY DATE IF FOR PRE-OP TESTING. Order Specific Question:   Has patient been transfused or pregnant in the last 3 mos. ? Answer:   Unknown    acetaminophen (TYLENOL PO)     Sig: Take  by mouth. doxylamine-pyridoxine, vit B6, (Diclegis) 10-10 mg TbEC DR tablet     Si tabs at bedtime, then 1 tab in AM if needed, then 1 tab in afternoon if needed. Max 4 tabs/d     Dispense:  120 Tablet     Refill:  2    ondansetron (ZOFRAN ODT) 4 mg disintegrating tablet     Sig: Take 1 Tablet by mouth every eight (8) hours as needed for Nausea or Vomiting. Dispense:  30 Tablet     Refill:  2    promethazine (PHENERGAN) 25 mg tablet     Sig: Take 1 Tablet by mouth every six (6) hours as needed for Nausea. Dispense:  30 Tablet     Refill:  1    PNV85-iron-folic acid-dha-fish (OB Complete One) 40-10-1-300 mg cap     Sig: Take 1 Capsule by mouth daily. Savings Xavier Payne: [id number]. PCN: [id number]. GRP: [id number].  ID: [id number]     Dispense:  90 Capsule     Refill:  4

## 2022-10-25 PROBLEM — Z34.90 PREGNANCY: Status: ACTIVE | Noted: 2022-10-25

## 2022-10-25 LAB
25(OH)D3+25(OH)D2 SERPL-MCNC: 14.6 NG/ML (ref 30–100)
ABO GROUP BLD: NORMAL
BLD GP AB SCN SERPL QL: NEGATIVE
ERYTHROCYTE [DISTWIDTH] IN BLOOD BY AUTOMATED COUNT: 11 % (ref 11.7–15.4)
FERRITIN SERPL-MCNC: 252 NG/ML (ref 15–150)
HBV SURFACE AG SERPL QL IA: NEGATIVE
HCT VFR BLD AUTO: 41.6 % (ref 34–46.6)
HCV AB S/CO SERPL IA: <0.1 S/CO RATIO (ref 0–0.9)
HGB BLD-MCNC: 13.8 G/DL (ref 11.1–15.9)
HIV 1+2 AB+HIV1 P24 AG SERPL QL IA: NON REACTIVE
MCH RBC QN AUTO: 31.4 PG (ref 26.6–33)
MCHC RBC AUTO-ENTMCNC: 33.2 G/DL (ref 31.5–35.7)
MCV RBC AUTO: 95 FL (ref 79–97)
PLATELET # BLD AUTO: 194 X10E3/UL (ref 150–450)
RBC # BLD AUTO: 4.4 X10E6/UL (ref 3.77–5.28)
RH BLD: POSITIVE
RPR SER QL: NON REACTIVE
RUBV IGG SERPL IA-ACNC: 4.75 INDEX
WBC # BLD AUTO: 9.3 X10E3/UL (ref 3.4–10.8)

## 2022-10-25 RX ORDER — ACETAMINOPHEN 500 MG
4000 TABLET ORAL DAILY
Qty: 180 CAPSULE | Refills: 1 | Status: SHIPPED | OUTPATIENT
Start: 2022-10-25

## 2022-10-26 LAB
C TRACH RRNA SPEC QL NAA+PROBE: NEGATIVE
N GONORRHOEA RRNA SPEC QL NAA+PROBE: NEGATIVE
T VAGINALIS RRNA SPEC QL NAA+PROBE: NEGATIVE

## 2022-10-28 LAB — BACTERIA UR CULT: NO GROWTH

## 2022-11-14 ENCOUNTER — LAB ONLY (OUTPATIENT)
Dept: OBGYN CLINIC | Age: 32
End: 2022-11-14

## 2022-11-14 DIAGNOSIS — Z34.90 PREGNANCY, UNSPECIFIED GESTATIONAL AGE: Primary | ICD-10-CM

## 2022-11-14 LAB — NIPT, EXTERNAL: NORMAL

## 2022-11-21 ENCOUNTER — ROUTINE PRENATAL (OUTPATIENT)
Dept: OBGYN CLINIC | Age: 32
End: 2022-11-21
Payer: COMMERCIAL

## 2022-11-21 VITALS
DIASTOLIC BLOOD PRESSURE: 67 MMHG | WEIGHT: 212 LBS | HEART RATE: 64 BPM | SYSTOLIC BLOOD PRESSURE: 103 MMHG | BODY MASS INDEX: 30.42 KG/M2

## 2022-11-21 DIAGNOSIS — Z34.90 PREGNANCY, UNSPECIFIED GESTATIONAL AGE: Primary | ICD-10-CM

## 2022-11-21 PROCEDURE — 0502F SUBSEQUENT PRENATAL CARE: CPT | Performed by: OBSTETRICS & GYNECOLOGY

## 2022-11-21 NOTE — PROGRESS NOTES
N/V improving. Ferritin elevated on NOB labs, has stopped suppl iron. Per pt, saw PCP, had rpt labs done, nl including nbfjmcrl=212 (). Panorama/Horizon drawn last wk, pending. LEEP: MFM . Will plan 20wk US with MFM as well. BEAU 4wks with AFP, rpt ferritin      - U=D. LMP for datin22 -> EDD23  - h/o LEEP -> MFM for cervical length  - ASA after 12-14wks (P0, BMI)  - concerns about Vit D def -> will draw today  - planning panorama, horizon.  Will schedule lab appt ~3wks  - some nausea -> eRX phenergan, zofran, diclegis  BEAU 4wks    -lnhbgikg=865 -> PCP/hematology ** ___  - Vit D=14 -> 4000iu daily, rpt 24-28wks ** ___

## 2022-12-02 ENCOUNTER — HOSPITAL ENCOUNTER (OUTPATIENT)
Dept: PERINATAL CARE | Age: 32
End: 2022-12-02
Attending: OBSTETRICS & GYNECOLOGY
Payer: COMMERCIAL

## 2022-12-02 PROCEDURE — 76805 OB US >/= 14 WKS SNGL FETUS: CPT | Performed by: OBSTETRICS & GYNECOLOGY

## 2022-12-02 RX ORDER — IBUPROFEN 200 MG
1 TABLET ORAL EVERY 24 HOURS
Qty: 30 PATCH | Refills: 0 | Status: SHIPPED | OUTPATIENT
Start: 2022-12-02 | End: 2023-01-01

## 2022-12-19 ENCOUNTER — ROUTINE PRENATAL (OUTPATIENT)
Dept: OBGYN CLINIC | Age: 32
End: 2022-12-19
Payer: COMMERCIAL

## 2022-12-19 VITALS
BODY MASS INDEX: 30.85 KG/M2 | HEART RATE: 74 BPM | SYSTOLIC BLOOD PRESSURE: 117 MMHG | DIASTOLIC BLOOD PRESSURE: 70 MMHG | WEIGHT: 215 LBS

## 2022-12-19 DIAGNOSIS — Z34.90 PREGNANCY, UNSPECIFIED GESTATIONAL AGE: Primary | ICD-10-CM

## 2022-12-19 PROCEDURE — 0502F SUBSEQUENT PRENATAL CARE: CPT | Performed by: OBSTETRICS & GYNECOLOGY

## 2022-12-19 NOTE — PROGRESS NOTES
No c/o. Early 1hr/ferritin/AFP today. Has MFM 1/3. BEAU 4wks.      - U=D. LMP for datin22 -> EDD23  - h/o LEEP -> MFM for cervical length ()  - ASA after 12-14wks (P0, BMI)  - Vit D (10/24 NOB)=14 -> OTC 4000iu daily -> rpt 24-28wks ** ___  -uapwqegb=389 -> stopped iron suppl, saw PCP, labs nl, gagfcsrq=723 () -> can rpt with AFP %% ___  - Vit D=14 -> 4000iu daily, rpt 24-28wks ** ___  - panorama low risk XX  Horizon neg all 27  - MFM US (22) 14+2 @ 14+0.  CL=4.07cm -> 1/3 ** ___  - early 1hr (per MFM)

## 2022-12-21 LAB
AFP INTERP SERPL-IMP: NORMAL
AFP INTERP SERPL-IMP: NORMAL
AFP MOM SERPL: 1.26
AFP SERPL-MCNC: 37.2 NG/ML
AGE AT DELIVERY: 34.9 YR
COMMENT, 018013: NORMAL
FERRITIN SERPL-MCNC: 263 NG/ML (ref 15–150)
GA METHOD: NORMAL
GA: 16.3 WEEKS
GLUCOSE 1H P 50 G GLC PO SERPL-MCNC: 94 MG/DL (ref 70–139)
IDDM PATIENT QL: NO
MULTIPLE PREGNANCY: NO
NEURAL TUBE DEFECT RISK FETUS: 5411 %
RESULTS, 017004: NORMAL

## 2023-01-03 ENCOUNTER — HOSPITAL ENCOUNTER (OUTPATIENT)
Dept: PERINATAL CARE | Age: 33
Discharge: HOME | End: 2023-01-03
Attending: OBSTETRICS & GYNECOLOGY
Payer: COMMERCIAL

## 2023-01-03 PROCEDURE — 76817 TRANSVAGINAL US OBSTETRIC: CPT | Performed by: OBSTETRICS & GYNECOLOGY

## 2023-01-03 PROCEDURE — 76811 OB US DETAILED SNGL FETUS: CPT | Performed by: OBSTETRICS & GYNECOLOGY

## 2023-01-17 ENCOUNTER — ROUTINE PRENATAL (OUTPATIENT)
Dept: OBGYN CLINIC | Age: 33
End: 2023-01-17
Payer: COMMERCIAL

## 2023-01-17 VITALS
DIASTOLIC BLOOD PRESSURE: 70 MMHG | HEART RATE: 80 BPM | WEIGHT: 218 LBS | BODY MASS INDEX: 31.28 KG/M2 | SYSTOLIC BLOOD PRESSURE: 127 MMHG

## 2023-01-17 DIAGNOSIS — Z34.90 PREGNANCY, UNSPECIFIED GESTATIONAL AGE: Primary | ICD-10-CM

## 2023-01-17 PROCEDURE — 0502F SUBSEQUENT PRENATAL CARE: CPT | Performed by: OBSTETRICS & GYNECOLOGY

## 2023-01-17 NOTE — PROGRESS NOTES
+FM. [facility] 1/3: nl cvx (LEEP), heart NWS -> rpt 2/3. BEAU 4wks. Plan 1hr/CBC/Vit D/TDAP at 28wks (had early 1hr).       - U=D. LMP for datin22 -> EDD23  - h/o LEEP -> (1/3 @18w) 4.3cm, no funnel  - ASA after 12-14wks (P0, BMI)  - Vit D (10/24 NOB)=14 -> OTC 4000iu daily -> rpt 24-28wks ** ___  -vbnxrecs=792 -> stopped iron suppl, saw PCP, labs nl, wnnvoxnz=708 (). ()=263  - Vit D=14 -> 4000iu daily, rpt 24-28wks ** ___  - panorama low risk XX. Horizon 27 neg. AFP low risk.  - [facility] (22) 14+2 @ 14+0. CL=4.07cm  - [facility] (1/3/23) 18+3 @ 18+4. Post. Heart NWS d/t position.  Cvx=4.3, no funnel -> 4wks ** ___  - early 1hr ( @16w)=94 -> rpt 28w ** ___

## 2023-01-31 ENCOUNTER — HOSPITAL ENCOUNTER (OUTPATIENT)
Dept: PERINATAL CARE | Age: 33
Discharge: HOME | End: 2023-01-31
Attending: OBSTETRICS & GYNECOLOGY
Payer: COMMERCIAL

## 2023-01-31 PROCEDURE — 76816 OB US FOLLOW-UP PER FETUS: CPT | Performed by: OBSTETRICS & GYNECOLOGY

## 2023-02-13 RX ORDER — NICOTINE 11MG/24HR
PATCH, TRANSDERMAL 24 HOURS TRANSDERMAL
Qty: 180 CAPSULE | Refills: 0 | Status: SHIPPED | OUTPATIENT
Start: 2023-02-13

## 2023-02-13 RX ORDER — PROMETHAZINE HYDROCHLORIDE 25 MG/1
25 TABLET ORAL
Qty: 30 TABLET | Refills: 1 | Status: SHIPPED | OUTPATIENT
Start: 2023-02-13

## 2023-02-13 NOTE — TELEPHONE ENCOUNTER
27year old  24w3d pregnant    Please advise regarding refill requests as pended from the pharmacy    Please advise  Thank you

## 2023-02-14 ENCOUNTER — ROUTINE PRENATAL (OUTPATIENT)
Dept: OBGYN CLINIC | Age: 33
End: 2023-02-14
Payer: COMMERCIAL

## 2023-02-14 VITALS
DIASTOLIC BLOOD PRESSURE: 65 MMHG | HEART RATE: 64 BPM | BODY MASS INDEX: 32.28 KG/M2 | SYSTOLIC BLOOD PRESSURE: 101 MMHG | WEIGHT: 225 LBS

## 2023-02-14 DIAGNOSIS — Z34.90 PREGNANCY, UNSPECIFIED GESTATIONAL AGE: Primary | ICD-10-CM

## 2023-02-14 PROCEDURE — 0502F SUBSEQUENT PRENATAL CARE: CPT | Performed by: OBSTETRICS & GYNECOLOGY

## 2023-02-14 NOTE — PROGRESS NOTES
Saw MFM (), wnl, f/up prn. Disc prob US ~34wks for growth (BMI). Has . Disc wt gain. BEAU 4wks with 1hr/CBC/VitD/TDAP.         - U=D. LMP for datin22 -> EDD23  - h/o LEEP -> (1/3 @18w) 4.3cm, no funnel  - ASA after 12-14wks (P0, BMI)  - Vit D (10/24 NOB)=14 -> OTC 4000iu daily -> rpt 24-28wks ** ___  -qwpfwngp=268 -> stopped iron suppl, saw PCP, labs nl, ffweytvu=209 (). ()=263  - Vit D=14 -> 4000iu daily, rpt 24-28wks ** ___  - panorama low risk XX. Horizon 27 neg. AFP low risk.  - MFM US (22) 14+2 @ 14+0. CL=4.07cm  - MFM US (1/3/23) 18+3 @ 18+4. Post. Heart NWS d/t position. Cvx=4.3, no funnel  - MFM US (23) 22+5 @ 22+4. 554gm (64%). Nl morph.  Cvx=3.6 -> prn  - early 1hr ( @16w)=94 -> rpt 28w ** ___

## 2023-03-17 ENCOUNTER — ROUTINE PRENATAL (OUTPATIENT)
Dept: OBGYN CLINIC | Age: 33
End: 2023-03-17

## 2023-03-17 VITALS
BODY MASS INDEX: 32.71 KG/M2 | WEIGHT: 228 LBS | HEART RATE: 73 BPM | SYSTOLIC BLOOD PRESSURE: 115 MMHG | DIASTOLIC BLOOD PRESSURE: 74 MMHG

## 2023-03-17 DIAGNOSIS — Z23 ENCOUNTER FOR IMMUNIZATION: ICD-10-CM

## 2023-03-17 DIAGNOSIS — Z34.90 PREGNANCY, UNSPECIFIED GESTATIONAL AGE: Primary | ICD-10-CM

## 2023-03-17 NOTE — PROGRESS NOTES
After obtaining consent, and per orders of mrava , injection of tdap 0.5ml given in right deltoid by Jb Gama RN. Patient instructed to remain in clinic for 20 minutes afterwards, and to report any adverse reaction to me immediately.  Lot: [id number] Exp: 6/13/25 [address]: 95037-719-68

## 2023-03-18 LAB
25(OH)D3+25(OH)D2 SERPL-MCNC: 24.8 NG/ML (ref 30–100)
ERYTHROCYTE [DISTWIDTH] IN BLOOD BY AUTOMATED COUNT: 11.3 % (ref 11.7–15.4)
GLUCOSE 1H P 50 G GLC PO SERPL-MCNC: 101 MG/DL (ref 70–139)
HCT VFR BLD AUTO: 35.3 % (ref 34–46.6)
HGB BLD-MCNC: 12.5 G/DL (ref 11.1–15.9)
MCH RBC QN AUTO: 33.4 PG (ref 26.6–33)
MCHC RBC AUTO-ENTMCNC: 35.4 G/DL (ref 31.5–35.7)
MCV RBC AUTO: 94 FL (ref 79–97)
PLATELET # BLD AUTO: 169 X10E3/UL (ref 150–450)
RBC # BLD AUTO: 3.74 X10E6/UL (ref 3.77–5.28)
WBC # BLD AUTO: 9.3 X10E3/UL (ref 3.4–10.8)

## 2023-03-31 ENCOUNTER — ROUTINE PRENATAL (OUTPATIENT)
Dept: OBGYN CLINIC | Age: 33
End: 2023-03-31

## 2023-03-31 VITALS
WEIGHT: 235 LBS | DIASTOLIC BLOOD PRESSURE: 71 MMHG | BODY MASS INDEX: 33.72 KG/M2 | SYSTOLIC BLOOD PRESSURE: 109 MMHG | HEART RATE: 74 BPM

## 2023-03-31 DIAGNOSIS — Z34.90 PREGNANCY, UNSPECIFIED GESTATIONAL AGE: ICD-10-CM

## 2023-03-31 NOTE — PROGRESS NOTES
+FM. Vit D=24, up from 14, cont 4000iud daily; 1hr nl. BEAU 2wks. US 2-4wks for growth.      - U=D. LMP for datin22 -> EDD23  - h/o LEEP -> (1/3 @18w) 4.3cm, no funnel  - ASA after 12-14wks (P0, BMI)  - Vit D (10/24 NOB)=14 -> OTC 4000iu daily  -hlokzdyh=504 -> stopped iron suppl, saw PCP, labs nl, jrepzxkt=635 (). ()=263  - Vit D=14 -> 4000iu daily. rpt  28wks= 24.8 -> cont 4000iu daily  - panorama low risk XX. Horizon 27 neg. AFP low risk.  - MFM US (22) 14+2 @ 14+0. CL=4.07cm  - MFM US (1/3/23) 18+3 @ 18+4. Post. Heart NWS d/t position. Cvx=4.3, no funnel  - MFM US (23) 22+5 @ 22+4. 554gm (64%). Nl morph.  Cvx=3.6 -> prn  - has felicia, hoping for low intervention  - early 1hr ( @16w)=94 -> rpt 39b=969

## 2023-04-20 ENCOUNTER — ROUTINE PRENATAL (OUTPATIENT)
Dept: OBGYN CLINIC | Age: 33
End: 2023-04-20

## 2023-04-20 VITALS
HEART RATE: 76 BPM | WEIGHT: 237 LBS | SYSTOLIC BLOOD PRESSURE: 102 MMHG | BODY MASS INDEX: 34.01 KG/M2 | DIASTOLIC BLOOD PRESSURE: 76 MMHG

## 2023-04-20 DIAGNOSIS — Z34.90 PREGNANCY, UNSPECIFIED GESTATIONAL AGE: ICD-10-CM

## 2023-04-20 RX ORDER — ASPIRIN 81 MG/1
TABLET ORAL DAILY
COMMUNITY

## 2023-04-20 NOTE — PROGRESS NOTES
LIMITED OB SCAN  A SINGLE VERTEX 33W6D IUP IS SEEN. FETAL CARDIAC MOTION OBSERVED. LIMITED ANATOMY WAS VISUALIZED AND APPEARS WNL. APPROPRIATE FETAL GROWTH IS SEEN. SIZE=DATES. BERYL AND PLACENTA APPEAR WITHIN NORMAL LIMITS.

## 2023-05-04 ENCOUNTER — ROUTINE PRENATAL (OUTPATIENT)
Dept: OBGYN CLINIC | Age: 33
End: 2023-05-04
Payer: COMMERCIAL

## 2023-05-04 VITALS
BODY MASS INDEX: 34.44 KG/M2 | WEIGHT: 240 LBS | HEART RATE: 77 BPM | DIASTOLIC BLOOD PRESSURE: 85 MMHG | SYSTOLIC BLOOD PRESSURE: 119 MMHG

## 2023-05-04 DIAGNOSIS — Z34.90 PREGNANCY, UNSPECIFIED GESTATIONAL AGE: Primary | ICD-10-CM

## 2023-05-04 PROCEDURE — 0502F SUBSEQUENT PRENATAL CARE: CPT | Performed by: OBSTETRICS & GYNECOLOGY

## 2023-05-06 LAB — GP B STREP DNA SPEC QL NAA+PROBE: NEGATIVE

## 2023-05-12 ENCOUNTER — ROUTINE PRENATAL (OUTPATIENT)
Age: 33
End: 2023-05-12

## 2023-05-12 VITALS
BODY MASS INDEX: 34.87 KG/M2 | SYSTOLIC BLOOD PRESSURE: 126 MMHG | WEIGHT: 243 LBS | DIASTOLIC BLOOD PRESSURE: 81 MMHG | HEART RATE: 105 BPM

## 2023-05-12 DIAGNOSIS — Z34.00 SUPERVISION OF NORMAL FIRST PREGNANCY, ANTEPARTUM: Primary | ICD-10-CM

## 2023-05-12 PROBLEM — Z34.90 PREGNANCY: Status: ACTIVE | Noted: 2022-10-25

## 2023-05-12 RX ORDER — PROMETHAZINE HYDROCHLORIDE 25 MG/1
25 TABLET ORAL EVERY 6 HOURS PRN
COMMUNITY
Start: 2023-02-13

## 2023-05-12 RX ORDER — DOXYLAMINE SUCCINATE AND PYRIDOXINE HYDROCHLORIDE, DELAYED RELEASE TABLETS 10 MG/10 MG 10; 10 MG/1; MG/1
TABLET, DELAYED RELEASE ORAL
COMMUNITY
Start: 2022-10-24

## 2023-05-12 RX ORDER — ASPIRIN 81 MG/1
TABLET ORAL DAILY
COMMUNITY

## 2023-05-12 RX ORDER — ONDANSETRON 4 MG/1
TABLET, ORALLY DISINTEGRATING ORAL
COMMUNITY
Start: 2023-02-13

## 2023-05-12 NOTE — PROGRESS NOTES
More hip/pelvic discomfort. Hoping for low intervention birth, wants to avoid IOL. Discussed IOL 41-42wks. KORIN 1wk. Can schedule US/EFW/BPP for  (41w).      - U=D. LMP for datin22 -> EDUARDO=23  - h/o LEEP -> (1/3 @18w) 4.3cm, no funnel  - ASA after 12-14wks (P0, BMI)  - Vit D (10/24 NOB)=14 -> OTC 4000iu daily. rpt @28wks= 24.8 -> cont 4000iu daily  - zlheyqov=321 -> stopped iron suppl, saw PCP, labs nl, ytkupoaq=303 (). ()=263  - panorama low risk XX. Horizon 27 neg. AFP low risk.  - MFM US (22) 14+2 @ 14+0. CL=4.07cm  - MFM US (1/3/23) 18+3 @ 18+4. Post. Heart NWS d/t position. Cvx=4.3, no funnel  - MFM US (23) 22+5 @ 22+4. 554gm (64%). Nl morph. Cvx=3.6 -> prn  - US (23)  33+4 @ 33+6. 2228gm (40%), AC=50%. DOMINGUEZ=13.65. Post. ceph.   - has george, hoping for low intervention  - early 1hr ( @16w)=94 -> rpt 32f=951  - GBS negative

## 2023-05-19 ENCOUNTER — ROUTINE PRENATAL (OUTPATIENT)
Age: 33
End: 2023-05-19

## 2023-05-19 VITALS
SYSTOLIC BLOOD PRESSURE: 123 MMHG | HEART RATE: 93 BPM | BODY MASS INDEX: 35.15 KG/M2 | DIASTOLIC BLOOD PRESSURE: 72 MMHG | WEIGHT: 245 LBS

## 2023-05-19 DIAGNOSIS — Z34.00 SUPERVISION OF NORMAL FIRST PREGNANCY, ANTEPARTUM: Primary | ICD-10-CM

## 2023-05-19 NOTE — PROGRESS NOTES
+FM.  No reg ctx. Cvs=FT/40%/-2/ceph/post/med. Labor/ROM prec. Tent IOL 6/15 with ephraim 6/14. KORIN lyle.

## 2023-05-26 ENCOUNTER — ROUTINE PRENATAL (OUTPATIENT)
Age: 33
End: 2023-05-26

## 2023-05-26 VITALS
BODY MASS INDEX: 35.87 KG/M2 | HEART RATE: 75 BPM | DIASTOLIC BLOOD PRESSURE: 78 MMHG | SYSTOLIC BLOOD PRESSURE: 119 MMHG | WEIGHT: 250 LBS

## 2023-05-26 DIAGNOSIS — Z34.00 SUPERVISION OF NORMAL FIRST PREGNANCY, ANTEPARTUM: Primary | ICD-10-CM

## 2023-05-26 PROCEDURE — 0502F SUBSEQUENT PRENATAL CARE: CPT | Performed by: OBSTETRICS & GYNECOLOGY

## 2023-05-26 NOTE — PROGRESS NOTES
+FM. +BHC. Cvx=-60/-2/ceph/post/med. Labor/ROM prec. KORIN 1wk.       - U=D. LMP for datin22 -> EDUARDO=23  - h/o LEEP -> (1/3 @18w) 4.3cm, no funnel  - ASA after 12-14wks (P0, BMI)  - Vit D (10/24 NOB)=14 -> OTC 4000iu daily. rpt @28wks= 24.8 -> cont 4000iu daily  - ilglwdlw=565 -> stopped iron suppl, saw PCP, labs nl, cwsiibjs=676 (). ()=263  - panorama low risk XX. Horizon 27 neg. AFP low risk.  - MFM US (22) 14+2 @ 14+0. CL=4.07cm  - MFM US (1/3/23) 18+3 @ 18+4. Post. Heart NWS d/t position. Cvx=4.3, no funnel  - MFM US (23) 22+5 @ 22+4. 554gm (64%). Nl morph. Cvx=3.6 -> prn  - US (23)  33+4 @ 33+6. 2228gm (40%), AC=50%. DOMINGUEZ=13.65. Post. ceph.   - has george, hoping for low intervention  - early 1hr ( @16w)=94 -> rpt 44i=306  - GBS negative

## 2023-06-02 ENCOUNTER — ROUTINE PRENATAL (OUTPATIENT)
Age: 33
End: 2023-06-02

## 2023-06-02 VITALS
HEART RATE: 74 BPM | DIASTOLIC BLOOD PRESSURE: 75 MMHG | WEIGHT: 251 LBS | SYSTOLIC BLOOD PRESSURE: 114 MMHG | BODY MASS INDEX: 36.01 KG/M2

## 2023-06-02 DIAGNOSIS — Z34.00 SUPERVISION OF NORMAL FIRST PREGNANCY, ANTEPARTUM: Primary | ICD-10-CM

## 2023-06-02 NOTE — PROGRESS NOTES
+FM.  +BHC, mucinous d/c. Cvx unchanged: /-2/POST/ceph/med. KORIN with US/BPP 1wk        - U=D. LMP for datin22 -> EDUARDO=23  - h/o LEEP -> (1/3 @18w) 4.3cm, no funnel  - ASA after 12-14wks (P0, BMI)  - Vit D (10/24 NOB)=14 -> OTC 4000iu daily. rpt @28wks= 24.8 -> cont 4000iu daily  - fjviqdfv=086 -> stopped iron suppl, saw PCP, labs nl, nxumwlbx=335 (). ()=263  - panorama low risk XX. Horizon 27 neg. AFP low risk.  - MFM US (22) 14+2 @ 14+0. CL=4.07cm  - MFM US (1/3/23) 18+3 @ 18+4. Post. Heart NWS d/t position. Cvx=4.3, no funnel  - MFM US (23) 22+5 @ 22+4. 554gm (64%). Nl morph. Cvx=3.6 -> prn  - US (23)  33+4 @ 33+6. 2228gm (40%), AC=50%. DOMINGUEZ=13.65. Post. ceph.   - has george, hoping for low intervention  - early 1hr ( @16w)=94 -> rpt 71z=966  - GBS negative  - IOL 6/15, cook

## 2023-06-09 ENCOUNTER — ROUTINE PRENATAL (OUTPATIENT)
Age: 33
End: 2023-06-09

## 2023-06-09 VITALS
SYSTOLIC BLOOD PRESSURE: 129 MMHG | BODY MASS INDEX: 36.45 KG/M2 | DIASTOLIC BLOOD PRESSURE: 83 MMHG | HEART RATE: 67 BPM | WEIGHT: 254 LBS

## 2023-06-09 DIAGNOSIS — Z34.90 PREGNANCY, UNSPECIFIED GESTATIONAL AGE: Primary | ICD-10-CM

## 2023-06-09 NOTE — PROGRESS NOTES
US/BPP today wnl: VCT=5982wp (55%), DOMINGUEZ=14.7, BPP=. IOL 6/15, cook .      - U=D. LMP for datin22 -> EDUARDO=23  - h/o LEEP -> (1/3 @18w) 4.3cm, no funnel  - ASA after 12-14wks (P0, BMI)  - Vit D (10/24 NOB)=14 -> OTC 4000iu daily. rpt @28wks= 24.8 -> cont 4000iu daily  - gvnkjsik=717 -> stopped iron suppl, saw PCP, labs nl, xvlmdywr=919 (). ()=263  - panorama low risk XX. Horizon 27 neg. AFP low risk.  - MFM US (22) 14+2 @ 14+0. CL=4.07cm  - MFM US (1/3/23) 18+3 @ 18+4. Post. Heart NWS d/t position. Cvx=4.3, no funnel  - MFM US (23) 22+5 @ 22+4. 554gm (64%). Nl morph. Cvx=3.6 -> prn  - US (23)  33+4 @ 33+6. 2228gm (40%), AC=50%. DOMINGUEZ=13.65. Post. ceph.   - has george, hoping for low intervention  - early 1hr ( @16w)=94 -> rpt 65q=381  - GBS negative  - IOL 6/15, cook

## 2023-07-18 SDOH — ECONOMIC STABILITY: FOOD INSECURITY: WITHIN THE PAST 12 MONTHS, THE FOOD YOU BOUGHT JUST DIDN'T LAST AND YOU DIDN'T HAVE MONEY TO GET MORE.: NEVER TRUE

## 2023-07-18 SDOH — ECONOMIC STABILITY: HOUSING INSECURITY
IN THE LAST 12 MONTHS, WAS THERE A TIME WHEN YOU DID NOT HAVE A STEADY PLACE TO SLEEP OR SLEPT IN A SHELTER (INCLUDING NOW)?: NO

## 2023-07-18 SDOH — ECONOMIC STABILITY: TRANSPORTATION INSECURITY
IN THE PAST 12 MONTHS, HAS LACK OF TRANSPORTATION KEPT YOU FROM MEETINGS, WORK, OR FROM GETTING THINGS NEEDED FOR DAILY LIVING?: NO

## 2023-07-18 SDOH — ECONOMIC STABILITY: FOOD INSECURITY: WITHIN THE PAST 12 MONTHS, YOU WORRIED THAT YOUR FOOD WOULD RUN OUT BEFORE YOU GOT MONEY TO BUY MORE.: NEVER TRUE

## 2023-07-18 SDOH — ECONOMIC STABILITY: INCOME INSECURITY: HOW HARD IS IT FOR YOU TO PAY FOR THE VERY BASICS LIKE FOOD, HOUSING, MEDICAL CARE, AND HEATING?: NOT HARD AT ALL

## 2023-07-21 ENCOUNTER — POSTPARTUM VISIT (OUTPATIENT)
Age: 33
End: 2023-07-21

## 2023-07-21 VITALS
DIASTOLIC BLOOD PRESSURE: 71 MMHG | HEART RATE: 73 BPM | WEIGHT: 234 LBS | SYSTOLIC BLOOD PRESSURE: 105 MMHG | BODY MASS INDEX: 33.58 KG/M2

## 2023-07-21 DIAGNOSIS — K64.9 HEMORRHOIDS, UNSPECIFIED HEMORRHOID TYPE: ICD-10-CM

## 2023-07-21 RX ORDER — ACETAMINOPHEN AND CODEINE PHOSPHATE 120; 12 MG/5ML; MG/5ML
1 SOLUTION ORAL DAILY
Qty: 84 TABLET | Refills: 1 | Status: SHIPPED | OUTPATIENT
Start: 2023-07-21

## 2023-07-21 RX ORDER — DOCUSATE SODIUM 100 MG/1
100 CAPSULE, LIQUID FILLED ORAL 2 TIMES DAILY
COMMUNITY

## 2023-07-21 RX ORDER — HYDROCORTISONE ACETATE PRAMOXINE HCL 1; 1 G/100G; G/100G
CREAM TOPICAL
Qty: 30 G | Refills: 2 | Status: SHIPPED | OUTPATIENT
Start: 2023-07-21

## 2023-10-05 ENCOUNTER — OFFICE VISIT (OUTPATIENT)
Age: 33
End: 2023-10-05
Payer: COMMERCIAL

## 2023-10-05 VITALS
DIASTOLIC BLOOD PRESSURE: 70 MMHG | HEART RATE: 73 BPM | SYSTOLIC BLOOD PRESSURE: 115 MMHG | BODY MASS INDEX: 33 KG/M2 | WEIGHT: 230 LBS

## 2023-10-05 DIAGNOSIS — Z01.419 ENCOUNTER FOR WELL WOMAN EXAM WITH ROUTINE GYNECOLOGICAL EXAM: Primary | ICD-10-CM

## 2023-10-05 DIAGNOSIS — Z12.4 SCREENING FOR CERVICAL CANCER: ICD-10-CM

## 2023-10-05 PROCEDURE — 99395 PREV VISIT EST AGE 18-39: CPT | Performed by: OBSTETRICS & GYNECOLOGY

## 2023-10-05 RX ORDER — ACETAMINOPHEN AND CODEINE PHOSPHATE 120; 12 MG/5ML; MG/5ML
1 SOLUTION ORAL DAILY
Qty: 84 TABLET | Refills: 4 | Status: SHIPPED | OUTPATIENT
Start: 2023-10-05

## 2023-10-05 NOTE — PROGRESS NOTES
Jack Cuevas is a 26 y.o. female returns for an annual exam     Chief Complaint   Patient presents with    Annual Exam       No LMP recorded. (Menstrual status: Breastfeeding). Her periods are nonexistent in flow. Problems: no problems  Birth Control: none. Last Pap: normal obtained 9/2020. She does have a history of abnormal pap smear       2009  + HPV and mild dysplasia. Tx w/ LEEP. q 6month pap x2 both negative.       With regard to the Gardisil vaccine, she has received all 3 injections
Answer:   brush     Order Specific Question:   Menstrual Status ? Answer: Other - See Notes [220154]     Comments:   breastfeeding     Order Specific Question:   Previous Treatment?           (Required)     Answer:   CONIZA    norethindrone (ORTHO MICRONOR) 0.35 MG tablet     Sig: Take 1 tablet by mouth daily     Dispense:  84 tablet     Refill:  4

## 2023-10-13 LAB
CYTOLOGIST CVX/VAG CYTO: NORMAL
CYTOLOGY CVX/VAG DOC CYTO: NORMAL
CYTOLOGY CVX/VAG DOC THIN PREP: NORMAL
DX ICD CODE: NORMAL
HPV GENOTYPE REFLEX: NORMAL
HPV I/H RISK 4 DNA CVX QL PROBE+SIG AMP: NEGATIVE
Lab: NORMAL
Lab: NORMAL
OTHER STN SPEC: NORMAL
STAT OF ADQ CVX/VAG CYTO-IMP: NORMAL

## 2024-05-19 ENCOUNTER — OFFICE VISIT (OUTPATIENT)
Age: 34
End: 2024-05-19

## 2024-05-19 VITALS
TEMPERATURE: 98.1 F | DIASTOLIC BLOOD PRESSURE: 69 MMHG | OXYGEN SATURATION: 97 % | RESPIRATION RATE: 18 BRPM | BODY MASS INDEX: 32.35 KG/M2 | SYSTOLIC BLOOD PRESSURE: 102 MMHG | HEIGHT: 70 IN | HEART RATE: 60 BPM | WEIGHT: 226 LBS

## 2024-05-19 DIAGNOSIS — S81.852A ANIMAL BITE OF LEFT LOWER LEG, INITIAL ENCOUNTER: Primary | ICD-10-CM

## 2024-05-19 RX ORDER — FLUTICASONE PROPIONATE 50 MCG
1 SPRAY, SUSPENSION (ML) NASAL DAILY
COMMUNITY
Start: 2024-03-22

## 2024-05-19 RX ORDER — DOXYCYCLINE HYCLATE 100 MG
100 TABLET ORAL 2 TIMES DAILY
Qty: 20 TABLET | Refills: 0 | Status: SHIPPED | OUTPATIENT
Start: 2024-05-19 | End: 2024-05-29

## 2024-05-25 LAB
BACTERIA SPEC AEROBE CULT: NORMAL
BACTERIA SPEC ANAEROBE CULT: NORMAL

## 2024-09-19 ENCOUNTER — PATIENT MESSAGE (OUTPATIENT)
Age: 34
End: 2024-09-19

## 2024-09-20 ENCOUNTER — TELEPHONE (OUTPATIENT)
Age: 34
End: 2024-09-20

## 2024-09-20 RX ORDER — PROMETHAZINE HYDROCHLORIDE 25 MG/1
25 TABLET ORAL 4 TIMES DAILY PRN
Qty: 60 TABLET | Refills: 1 | Status: SHIPPED | OUTPATIENT
Start: 2024-09-20 | End: 2024-09-27

## 2024-09-20 RX ORDER — ONDANSETRON 8 MG/1
8 TABLET, ORALLY DISINTEGRATING ORAL 3 TIMES DAILY PRN
Qty: 60 TABLET | Refills: 4 | Status: SHIPPED | OUTPATIENT
Start: 2024-09-20

## 2024-10-10 NOTE — PROGRESS NOTES
Jesse Schroeder is a 34 y.o. female presents for a new pregnancy visit.    No chief complaint on file.      Problems: {Gyn Problem List:78656}     No LMP recorded.    Last Pap: normal obtained 1 year(s) ago. Hx of LEEP.     LMP history:  The date of her LMP is *** certain.  Her last menstrual period was normal and lasted for 4 to 5 days.  A urine pregnancy test was positive *** weeks ago. She was not on the pill at conception.     Based on her LMP, her EDC is *** and her EGA is *** weeks,*** days. Her menstrual cycles are regular and occur approximately every 28 days and range from 3 to 5 days. The last menses lasted *** the usual number of days.      Ultrasound data:  She had an ultrasound done by the ultrasound tech today which revealed a viable saunders pregnancy with a gestational age of *** weeks and *** days giving an EDC of ***.    Pregnancy symptoms:    Since her LMP she has experienced urinary frequency, breast tenderness, and nausea.   She {has/has not:47506} been vomiting over the last few weeks.  Associated signs and symptoms which she denies: dysuria, discharge, vaginal bleeding.    She states she has gained weight:  Approximately 5 pounds over the last few weeks.    Relevant past pregnancy history:   She has the following pregnancy history: ***    She has no history of  delivery.    Relevant past medical history:(relevant to this pregnancy): noncontributory.      Her occupation is: ***.         Examination chaperoned by Lisa Albert MA.

## 2024-10-17 NOTE — PROGRESS NOTES
Jesse Schroeder is a 34 y.o. female presents for a new pregnancy visit.    Chief Complaint   Patient presents with    Initial Prenatal Visit       Problems:  no c/o      Patient's last menstrual period was 2024 (exact date).    Last Pap: normal obtained 1 year(s) ago. Hx of LEEP.     LMP history:  The date of her LMP is 2024 certain.  Her last menstrual period was normal and lasted for 4 to 5 days. She was not on the pill at conception.     Based on her LMP, her EDC is 5/15/2025 and her EGA is 10 weeks,1 days. Her menstrual cycles are regular and occur approximately every 28 days and range from 3 to 5 days. The last menses lasted 5 the usual number of days.      Ultrasound data:  She had an ultrasound done by the ultrasound tech today which revealed a viable saunders pregnancy with a gestational age of 10 weeks and 2 days giving an EDC of 2025.    Pregnancy symptoms:    Since her LMP she has experienced urinary frequency, breast tenderness, and nausea.   She has not been vomiting over the last few weeks.  Associated signs and symptoms which she denies: dysuria, discharge, vaginal bleeding.    She states she has gained weight:  Approximately 5 pounds over the last few weeks.    Relevant past pregnancy history:   She has the following pregnancy history:     She has no history of  delivery.    Relevant past medical history:(relevant to this pregnancy): noncontributory.      Her occupation is: .       Examination chaperoned by Yaima Hilton MA.

## 2024-10-18 ENCOUNTER — ROUTINE PRENATAL (OUTPATIENT)
Age: 34
End: 2024-10-18

## 2024-10-18 VITALS
DIASTOLIC BLOOD PRESSURE: 64 MMHG | BODY MASS INDEX: 32.21 KG/M2 | SYSTOLIC BLOOD PRESSURE: 109 MMHG | HEIGHT: 70 IN | HEART RATE: 62 BPM | WEIGHT: 225 LBS

## 2024-10-18 DIAGNOSIS — Z12.4 CERVICAL CANCER SCREENING: ICD-10-CM

## 2024-10-18 DIAGNOSIS — Z11.3 SCREENING FOR VENEREAL DISEASE: ICD-10-CM

## 2024-10-18 DIAGNOSIS — Z34.90 EARLY STAGE OF PREGNANCY: Primary | ICD-10-CM

## 2024-10-18 DIAGNOSIS — Z34.80 PRENATAL CARE OF MULTIGRAVIDA, ANTEPARTUM: Primary | ICD-10-CM

## 2024-10-18 PROCEDURE — 0500F INITIAL PRENATAL CARE VISIT: CPT | Performed by: OBSTETRICS & GYNECOLOGY

## 2024-10-18 RX ORDER — VALACYCLOVIR HYDROCHLORIDE 1 G/1
2000 TABLET, FILM COATED ORAL EVERY 12 HOURS
Qty: 30 TABLET | Refills: 2 | Status: SHIPPED | OUTPATIENT
Start: 2024-10-18 | End: 2024-10-19

## 2024-10-18 NOTE — PROGRESS NOTES
Current pregnancy history:    Jesse Schroeder is a ,  34 y.o. female White (non-) Patient's last menstrual period was 2024 (exact date)..  She presents for the evaluation of amenorrhea and a positive pregnancy test.    Physician review of ultrasound performed by technician  I discussed with the patient the limitations of ultrasound and that imaging can not rule out all birth defects, chromosomal problems, or other problems with the baby.  Today's ultrasound report and images were reviewed and discussed with the patient.  Please see images and imaging report entered by technician in PACS for more detail     US findings:  Transabdominal ultrasound was performed.  A single viable intrauterine pregnancy with normal cardiac rhythm.  Gestational age based on today's ultrasound was consistent with 10 weeks 2 days.  A normal yolk sac was seen.  Right and left ovaries appeared within normal limits.  There was a left corpus luteum cyst.  No free fluid was seen in the cul-de-sac.    Per nursing Note:  Problems:  no c/o       Patient's last menstrual period was 2024 (exact date).     Last Pap: normal obtained 1 year(s) ago. Hx of LEEP.      LMP history:  The date of her LMP is 2024 certain.  Her last menstrual period was normal and lasted for 4 to 5 days. She was not on the pill at conception.      Based on her LMP, her EDC is 5/15/2025 and her EGA is 10 weeks,1 days. Her menstrual cycles are regular and occur approximately every 28 days and range from 3 to 5 days. The last menses lasted 5 the usual number of days.      Ultrasound data:  She had an ultrasound done by the ultrasound tech today which revealed a viable saunders pregnancy with a gestational age of 10 weeks and 2 days giving an EDC of 2025.     Pregnancy symptoms:     Since her LMP she has experienced urinary frequency, breast tenderness, and nausea.   She has not been vomiting over the last few weeks.  Associated signs and

## 2024-10-19 LAB
25(OH)D3+25(OH)D2 SERPL-MCNC: 19 NG/ML (ref 30–100)
ABO GROUP BLD: NORMAL
BACTERIA UR CULT: NORMAL
BLD GP AB SCN SERPL QL: NEGATIVE
ERYTHROCYTE [DISTWIDTH] IN BLOOD BY AUTOMATED COUNT: 11.4 % (ref 11.7–15.4)
HBV SURFACE AG SERPL QL IA: NEGATIVE
HCT VFR BLD AUTO: 39.2 % (ref 34–46.6)
HCV IGG SERPL QL IA: NON REACTIVE
HGB BLD-MCNC: 13.4 G/DL (ref 11.1–15.9)
HIV 1+2 AB+HIV1 P24 AG SERPL QL IA: NON REACTIVE
MCH RBC QN AUTO: 32.4 PG (ref 26.6–33)
MCHC RBC AUTO-ENTMCNC: 34.2 G/DL (ref 31.5–35.7)
MCV RBC AUTO: 95 FL (ref 79–97)
PLATELET # BLD AUTO: 191 X10E3/UL (ref 150–450)
RBC # BLD AUTO: 4.14 X10E6/UL (ref 3.77–5.28)
RH BLD: POSITIVE
RUBV IGG SERPL IA-ACNC: 1.52 INDEX
VZV IGG SER QL IA: REACTIVE
WBC # BLD AUTO: 7.5 X10E3/UL (ref 3.4–10.8)

## 2024-10-20 LAB — TREPONEMA PALLIDUM IGG+IGM AB [PRESENCE] IN SERUM OR PLASMA BY IMMUNOASSAY: NON REACTIVE

## 2024-10-22 LAB
HGB A MFR BLD ELPH: 97.4 % (ref 96.4–98.8)
HGB A2 MFR BLD ELPH: 2.6 % (ref 1.8–3.2)
HGB F MFR BLD ELPH: 0 % (ref 0–2)
HGB FRACT BLD-IMP: NORMAL
HGB S MFR BLD ELPH: 0 %

## 2024-10-23 LAB
C TRACH RRNA CVX QL NAA+PROBE: NEGATIVE
CYTOLOGIST CVX/VAG CYTO: NORMAL
CYTOLOGY CVX/VAG DOC CYTO: NORMAL
CYTOLOGY CVX/VAG DOC THIN PREP: NORMAL
DX ICD CODE: NORMAL
HPV GENOTYPE REFLEX: NORMAL
HPV I/H RISK 4 DNA CVX QL PROBE+SIG AMP: NEGATIVE
Lab: NORMAL
N GONORRHOEA RRNA CVX QL NAA+PROBE: NEGATIVE
OTHER STN SPEC: NORMAL
STAT OF ADQ CVX/VAG CYTO-IMP: NORMAL
T VAGINALIS RRNA SPEC QL NAA+PROBE: NEGATIVE

## 2024-10-25 LAB
Lab: NORMAL
NTRA 22Q11.2 DELETION SYNDROME POPULATION-BASED RISK TEXT: NORMAL
NTRA 22Q11.2 DELETION SYNDROME RESULT TEXT: NORMAL
NTRA 22Q11.2 DELETION SYNDROME RISK SCORE TEXT: NORMAL
NTRA FETAL FRACTION: NORMAL
NTRA FETAL RHD SUMMARY: NORMAL
NTRA GENDER OF FETUS: NORMAL
NTRA MONOSOMY X AGE-BASED RISK TEXT: NORMAL
NTRA MONOSOMY X RESULT TEXT: NORMAL
NTRA MONOSOMY X RISK SCORE TEXT: NORMAL
NTRA TRIPLOIDY RESULT TEXT: NORMAL
NTRA TRISOMY 13 AGE-BASED RISK TEXT: NORMAL
NTRA TRISOMY 13 RESULT TEXT: NORMAL
NTRA TRISOMY 13 RISK SCORE TEXT: NORMAL
NTRA TRISOMY 18 AGE-BASED RISK TEXT: NORMAL
NTRA TRISOMY 18 RESULT TEXT: NORMAL
NTRA TRISOMY 18 RISK SCORE TEXT: NORMAL
NTRA TRISOMY 21 AGE-BASED RISK TEXT: NORMAL
NTRA TRISOMY 21 RESULT TEXT: NORMAL
NTRA TRISOMY 21 RISK SCORE TEXT: NORMAL

## 2024-11-06 ENCOUNTER — TELEPHONE (OUTPATIENT)
Age: 34
End: 2024-11-06

## 2024-11-12 SDOH — ECONOMIC STABILITY: INCOME INSECURITY: HOW HARD IS IT FOR YOU TO PAY FOR THE VERY BASICS LIKE FOOD, HOUSING, MEDICAL CARE, AND HEATING?: NOT HARD AT ALL

## 2024-11-12 SDOH — ECONOMIC STABILITY: FOOD INSECURITY: WITHIN THE PAST 12 MONTHS, YOU WORRIED THAT YOUR FOOD WOULD RUN OUT BEFORE YOU GOT MONEY TO BUY MORE.: NEVER TRUE

## 2024-11-12 SDOH — ECONOMIC STABILITY: FOOD INSECURITY: WITHIN THE PAST 12 MONTHS, THE FOOD YOU BOUGHT JUST DIDN'T LAST AND YOU DIDN'T HAVE MONEY TO GET MORE.: NEVER TRUE

## 2024-11-15 ENCOUNTER — ROUTINE PRENATAL (OUTPATIENT)
Age: 34
End: 2024-11-15

## 2024-11-15 VITALS
WEIGHT: 227 LBS | DIASTOLIC BLOOD PRESSURE: 72 MMHG | SYSTOLIC BLOOD PRESSURE: 115 MMHG | BODY MASS INDEX: 32.57 KG/M2 | HEART RATE: 64 BPM

## 2024-11-15 DIAGNOSIS — Z34.80 PRENATAL CARE OF MULTIGRAVIDA, ANTEPARTUM: Primary | ICD-10-CM

## 2024-11-16 LAB — GLUCOSE 1H P 50 G GLC PO SERPL-MCNC: 126 MG/DL (ref 70–139)

## 2024-12-13 ENCOUNTER — ROUTINE PRENATAL (OUTPATIENT)
Age: 34
End: 2024-12-13

## 2024-12-13 VITALS
DIASTOLIC BLOOD PRESSURE: 64 MMHG | WEIGHT: 227 LBS | HEART RATE: 72 BPM | SYSTOLIC BLOOD PRESSURE: 117 MMHG | BODY MASS INDEX: 32.57 KG/M2

## 2024-12-13 DIAGNOSIS — Z34.80 PRENATAL CARE OF MULTIGRAVIDA, ANTEPARTUM: Primary | ICD-10-CM

## 2024-12-13 DIAGNOSIS — Z34.80 PRENATAL CARE OF MULTIGRAVIDA, ANTEPARTUM: ICD-10-CM

## 2024-12-13 PROCEDURE — 0502F SUBSEQUENT PRENATAL CARE: CPT | Performed by: OBSTETRICS & GYNECOLOGY

## 2024-12-15 LAB
AFP INTERP SERPL-IMP: NORMAL
AFP INTERP SERPL-IMP: NORMAL
AFP MOM SERPL: 1.38
AFP SERPL-MCNC: 46.2 NG/ML
AGE AT DELIVERY: 36.8 YR
COMMENT: NORMAL
GA METHOD: NORMAL
GA: 18 WEEKS
IDDM PATIENT QL: NO
Lab: NORMAL
MULTIPLE PREGNANCY: NO
NEURAL TUBE DEFECT RISK FETUS: 3810 %

## 2024-12-30 ENCOUNTER — ROUTINE PRENATAL (OUTPATIENT)
Age: 34
End: 2024-12-30
Payer: COMMERCIAL

## 2024-12-30 VITALS — SYSTOLIC BLOOD PRESSURE: 113 MMHG | DIASTOLIC BLOOD PRESSURE: 68 MMHG | HEART RATE: 82 BPM

## 2024-12-30 DIAGNOSIS — Z3A.20 20 WEEKS GESTATION OF PREGNANCY: Primary | ICD-10-CM

## 2024-12-30 PROCEDURE — 76811 OB US DETAILED SNGL FETUS: CPT | Performed by: OBSTETRICS & GYNECOLOGY

## 2024-12-30 PROCEDURE — 99203 OFFICE O/P NEW LOW 30 MIN: CPT | Performed by: OBSTETRICS & GYNECOLOGY

## 2024-12-31 NOTE — PROCEDURES
cord  Amniotic fluid: Amount of AF: normal. MVP 5.2 cm    Fetal Anatomy  ===========    Cranium: normal  Lateral ventricles: normal  Choroid plexus: normal  Midline falx: normal  Cavum septi pellucidi: normal  Cerebellum: normal  Cisterna magna: normal  Head / Neck  Rt lateral ventricle: normal  Lt lateral ventricle: normal  Vermis: normal  Neck: normal  Lips: SUBOPTIMAL  Profile: normal  Nose: SUBOPTIMAL  Face  Coronal face: normal  Nasal bone: present  Palate: SUBOPTIMAL  Maxilla: normal  Mandible: normal  Orbits: normal  4-chamber view: normal  RVOT view: normal  LVOT view: normal  3-vessel view: normal  3-vessel-trachea view: normal  Heart / Thorax  Situs: situs solitus (normal)  Aortic arch view: normal  Bicaval view: normal  Ductal arch view: normal  Interventricular septum: SUBOPTIMAL  Cardiac position: levocardia (normal)  Cardiac axis: normal  Cardiac size: normal (approx. 1/3 of thoracic area)  Cardiac rhythm: regular (normal)  Rt lung: normal  Lt lung: normal  Diaphragm: normal  Cord insertion: normal  Stomach: normal  Kidneys: normal  Bladder: normal  Genitals: normal  Abdomen  Rt renal artery: normal  Lt renal artery: normal  Cervical spine: normal  Thoracic spine: normal  Lumbar spine: normal  Sacral spine: normal  Rt arm: normal  Lt arm: normal  Rt upper arm: normal  Rt forearm: normal  Rt hand: normal  Rt fingers: SUBOPTIMAL  Lt upper arm: normal  Lt forearm: normal  Lt hand: normal  Lt fingers: SUBOPTIMAL  Rt leg: normal  Lt leg: normal  Rt foot: normal  Rt toes: normal  Lt foot: normal  Lt toes: normal  Wants to know fetal sex: yes    Maternal Structures  ===============    Uterus / Cervix  Cervix: Visualized  Approach: Transvaginal  Cervical length 4.73 cm  Ovaries / Tubes / Adnexa  Rt ovary: Visualized  Rt ovary D1 3.3 cm  Rt ovary D2 2.0 cm  Rt ovary D3 1.5 cm  Rt ovary Vol 5.2 cm³  Lt ovary: Visualized  Lt ovary D1 2.8 cm  Lt ovary D2 1.9 cm  Lt ovary D3 1.6 cm  Lt ovary Vol 4.6

## 2025-01-10 ENCOUNTER — ROUTINE PRENATAL (OUTPATIENT)
Age: 35
End: 2025-01-10

## 2025-01-10 VITALS
WEIGHT: 227 LBS | DIASTOLIC BLOOD PRESSURE: 53 MMHG | SYSTOLIC BLOOD PRESSURE: 106 MMHG | HEART RATE: 61 BPM | BODY MASS INDEX: 32.57 KG/M2

## 2025-01-10 DIAGNOSIS — Z34.82 PRENATAL CARE, SUBSEQUENT PREGNANCY IN SECOND TRIMESTER: Primary | ICD-10-CM

## 2025-01-10 PROCEDURE — 0502F SUBSEQUENT PRENATAL CARE: CPT | Performed by: OBSTETRICS & GYNECOLOGY

## 2025-01-24 ENCOUNTER — ROUTINE PRENATAL (OUTPATIENT)
Age: 35
End: 2025-01-24

## 2025-01-24 VITALS — DIASTOLIC BLOOD PRESSURE: 73 MMHG | SYSTOLIC BLOOD PRESSURE: 112 MMHG | HEART RATE: 73 BPM

## 2025-01-24 DIAGNOSIS — Z34.80 PRENATAL CARE OF MULTIGRAVIDA, ANTEPARTUM: Primary | ICD-10-CM

## 2025-01-24 NOTE — PROCEDURES
PATIENT: REMA LAW   -  : 1990   -  DOS:2025   -  INTERPRETING PROVIDER:Franki Davis,   Indication  ========    Previous cervical surgery, Advanced Maternal Age    Method  ======    Transabdominal ultrasound examination. View: Good view    Pregnancy  =========    Aaron pregnancy. Number of fetuses: 1    Dating  ======    LMP on: 2024  Cycle: regular cycle  GA by LMP 24 w + 1 d  EDUARDO by LMP: 5/15/2025  Previous Ultrasound on: 10/18/2024  Type of prior assessment: GA  GA at prior assessment date 10 w + 2 d  GA by previous U/S 24 w + 2 d  EDUARDO by previous Ultrasound: 2025  Ultrasound examination on: 2025  GA by U/S based upon: AC, BPD, Femur, HC  GA by U/S 23 w + 6 d  EDUARDO by U/S: 2025  Assigned: based on the LMP, selected on 2024  Assigned GA 24 w + 1 d  Assigned EDUARDO: 5/15/2025    Fetal Biometry  ============    Standard  BPD 54.9 mm 22w 5d 6% Hadlock  OFD 74.4 mm 24w 4d 67% Duke  .7 mm 22w 6d 3% Hadlock  .6 mm 24w 4d 53% Hadlock  Femur 45.6 mm 25w 1d 68% Hadlock   g 24w 2d 56% Hadlock  EFW (lb) 1 lb  EFW (oz) 9 oz  EFW by: Hadlock (BPD-HC-AC-FL)  Other Structures   bpm    General Evaluation  ==============    Cardiac activity present.  bpm. Fetal movements: visualized. Presentation: Cephalic  Placenta: Placental site: left lateral, appropriate distance from the internal os  Umbilical cord: Cord vessels: 3 vessel cord. Insertion site: central  Amniotic fluid: Amount of AF: normal. MVP 3.1 cm    Fetal Anatomy  ===========    Lips: normal  Nose: normal  Face  Palate: normal  Heart / Thorax  Interventricular septum: normal  Stomach: normal  Kidneys: normal  Bladder: normal  Rt fingers: normal  Lt fingers: normal  Wants to know fetal sex: yes    Findings  =======    Intrauterine Aaron pregnancy at 24w 1d by clinical dates.  EFW is 700 g at 56%, abdominal circumference at 53%.  Amniotic fluid: normal.  Placenta is left lateral,

## 2025-01-24 NOTE — PROGRESS NOTES
Patient was seen 1/24/2025      Please look under media to view full consult and ultrasound report in ViewPoint.         Franki Davis MD  Maternal Fetal Medicine

## 2025-02-04 SDOH — ECONOMIC STABILITY: FOOD INSECURITY: WITHIN THE PAST 12 MONTHS, THE FOOD YOU BOUGHT JUST DIDN'T LAST AND YOU DIDN'T HAVE MONEY TO GET MORE.: NEVER TRUE

## 2025-02-04 SDOH — ECONOMIC STABILITY: INCOME INSECURITY: IN THE LAST 12 MONTHS, WAS THERE A TIME WHEN YOU WERE NOT ABLE TO PAY THE MORTGAGE OR RENT ON TIME?: NO

## 2025-02-04 SDOH — ECONOMIC STABILITY: FOOD INSECURITY: WITHIN THE PAST 12 MONTHS, YOU WORRIED THAT YOUR FOOD WOULD RUN OUT BEFORE YOU GOT MONEY TO BUY MORE.: NEVER TRUE

## 2025-02-04 SDOH — ECONOMIC STABILITY: TRANSPORTATION INSECURITY
IN THE PAST 12 MONTHS, HAS THE LACK OF TRANSPORTATION KEPT YOU FROM MEDICAL APPOINTMENTS OR FROM GETTING MEDICATIONS?: NO

## 2025-02-07 ENCOUNTER — ROUTINE PRENATAL (OUTPATIENT)
Age: 35
End: 2025-02-07

## 2025-02-07 VITALS
WEIGHT: 230 LBS | HEIGHT: 70 IN | BODY MASS INDEX: 32.93 KG/M2 | SYSTOLIC BLOOD PRESSURE: 127 MMHG | DIASTOLIC BLOOD PRESSURE: 68 MMHG | HEART RATE: 71 BPM

## 2025-02-07 DIAGNOSIS — Z34.82 PRENATAL CARE, SUBSEQUENT PREGNANCY IN SECOND TRIMESTER: Primary | ICD-10-CM

## 2025-02-07 PROCEDURE — 0502F SUBSEQUENT PRENATAL CARE: CPT | Performed by: OBSTETRICS & GYNECOLOGY

## 2025-02-21 ENCOUNTER — ROUTINE PRENATAL (OUTPATIENT)
Age: 35
End: 2025-02-21
Payer: COMMERCIAL

## 2025-02-21 VITALS
WEIGHT: 231 LBS | BODY MASS INDEX: 33.15 KG/M2 | HEART RATE: 74 BPM | DIASTOLIC BLOOD PRESSURE: 69 MMHG | SYSTOLIC BLOOD PRESSURE: 117 MMHG

## 2025-02-21 DIAGNOSIS — Z34.90 PREGNANCY, UNSPECIFIED GESTATIONAL AGE: ICD-10-CM

## 2025-02-21 DIAGNOSIS — Z23 NEED FOR TDAP VACCINATION: Primary | ICD-10-CM

## 2025-02-21 PROCEDURE — 0502F SUBSEQUENT PRENATAL CARE: CPT | Performed by: OBSTETRICS & GYNECOLOGY

## 2025-02-21 PROCEDURE — 90715 TDAP VACCINE 7 YRS/> IM: CPT | Performed by: OBSTETRICS & GYNECOLOGY

## 2025-02-21 PROCEDURE — 90471 IMMUNIZATION ADMIN: CPT | Performed by: OBSTETRICS & GYNECOLOGY

## 2025-02-22 LAB
ERYTHROCYTE [DISTWIDTH] IN BLOOD BY AUTOMATED COUNT: 11.4 % (ref 11.7–15.4)
GLUCOSE 1H P 50 G GLC PO SERPL-MCNC: 143 MG/DL (ref 70–139)
HCT VFR BLD AUTO: 34.6 % (ref 34–46.6)
HGB BLD-MCNC: 11.7 G/DL (ref 11.1–15.9)
MCH RBC QN AUTO: 32.2 PG (ref 26.6–33)
MCHC RBC AUTO-ENTMCNC: 33.8 G/DL (ref 31.5–35.7)
MCV RBC AUTO: 95 FL (ref 79–97)
PLATELET # BLD AUTO: 161 X10E3/UL (ref 150–450)
RBC # BLD AUTO: 3.63 X10E6/UL (ref 3.77–5.28)
WBC # BLD AUTO: 8.6 X10E3/UL (ref 3.4–10.8)

## 2025-02-25 LAB — TREPONEMA PALLIDUM IGG+IGM AB [PRESENCE] IN SERUM OR PLASMA BY IMMUNOASSAY: NON REACTIVE

## 2025-03-19 DIAGNOSIS — Z34.90 PREGNANCY, UNSPECIFIED GESTATIONAL AGE: Primary | ICD-10-CM

## 2025-03-21 ENCOUNTER — ROUTINE PRENATAL (OUTPATIENT)
Age: 35
End: 2025-03-21

## 2025-03-21 VITALS — DIASTOLIC BLOOD PRESSURE: 57 MMHG | SYSTOLIC BLOOD PRESSURE: 91 MMHG | HEART RATE: 67 BPM | OXYGEN SATURATION: 97 %

## 2025-03-21 DIAGNOSIS — Z34.90 PREGNANCY, UNSPECIFIED GESTATIONAL AGE: ICD-10-CM

## 2025-03-21 NOTE — PROGRESS NOTES
Please see ultrasound report under Imaging tab or Media tab.  Lily Avalos MD  [facility]

## 2025-03-21 NOTE — PROCEDURES
PATIENT: REMA LAW   -  : 1990   -  DOS:2025   -  INTERPRETING PROVIDER:Lily Avalos,   Indication  ========    Previous cervical surgery, Advanced Maternal Age    Method  ======    Transabdominal ultrasound examination. View: Good view    Pregnancy  =========    Aaron pregnancy. Number of fetuses: 1    Dating  ======    LMP on: 2024  Cycle: regular cycle  GA by LMP 32 w + 1 d  EDUARDO by LMP: 5/15/2025  Previous Ultrasound on: 10/18/2024  Type of prior assessment: GA  GA at prior assessment date 10 w + 2 d  GA by previous U/S 32 w + 2 d  EDUARDO by previous Ultrasound: 2025  Ultrasound examination on: 3/21/2025  GA by U/S based upon: AC, BPD, Femur, HC  GA by U/S 32 w + 2 d  EDUARDO by U/S: 2025  Assigned: based on the LMP, selected on 2024  Assigned GA 32 w + 1 d  Assigned EDUARDO: 5/15/2025    Fetal Biometry  ============    Standard  BPD 79.0 mm 31w 5d 28% Hadlock  .0 mm 33w 4d 80% Duke  .9 mm 32w 0d 13% Hadlock  .0 mm 32w 5d 66% Hadlock  Femur 63.4 mm 32w 5d 55% Hadlock  EFW 2,003 g 32w 2d 53% Hadlock  EFW (lb) 4 lb  EFW (oz) 7 oz  EFW by: Hadlock (BPD-HC-AC-FL)  Other Structures   bpm    General Evaluation  ==============    Cardiac activity present.  bpm. Fetal movements: visualized. Presentation: Cephalic  Placenta: Placental site: left lateral, appropriate distance from the internal os  Umbilical cord: Cord vessels: 3 vessel cord. Insertion site: central  Amniotic fluid: Amount of AF: normal. MVP 5.5 cm. DOMINGUZE 13.3 cm. Q1 1.9 cm, Q2 4.0 cm, Q3 5.5 cm, Q4 1.8 cm    Fetal Anatomy  ===========    Stomach: normal  Kidneys: normal  Bladder: normal  Wants to know fetal sex: yes    Findings  =======    Intrauterine Aaron pregnancy at 32w 1d by clinical dates.  EFW is 2003 g at 53%, abdominal circumference at 66%.  Amniotic fluid: normal.  Placenta is left lateral, appropriate distance from the internal os.  Cephalic presentation.    The

## 2025-03-24 ENCOUNTER — ROUTINE PRENATAL (OUTPATIENT)
Age: 35
End: 2025-03-24

## 2025-03-24 VITALS — SYSTOLIC BLOOD PRESSURE: 104 MMHG | BODY MASS INDEX: 34.29 KG/M2 | WEIGHT: 239 LBS | DIASTOLIC BLOOD PRESSURE: 65 MMHG

## 2025-03-24 DIAGNOSIS — Z34.83 PRENATAL CARE, SUBSEQUENT PREGNANCY IN THIRD TRIMESTER: Primary | ICD-10-CM

## 2025-03-24 PROCEDURE — 0502F SUBSEQUENT PRENATAL CARE: CPT | Performed by: OBSTETRICS & GYNECOLOGY

## 2025-03-24 NOTE — PATIENT INSTRUCTIONS
Patient Education        Weeks 32 to 34 of Your Pregnancy: Care Instructions    Decide whether you want to bank or donate your baby's umbilical cord blood. If you want to save this blood, you have to arrange for it ahead of time.   Decide about circumcision. Personal, Restorationist, or cultural beliefs may play a role in your decision. You get to decide what you want for your baby.         Learn how to ease hemorrhoids.   Get more liquids, fruits, vegetables, and fiber in your diet.  Avoid sitting for too long.  Clean yourself with moist toilet paper. Or try witch hazel pads.  Try ice packs or warm sitz baths for discomfort.  Use hydrocortisone cream for pain or itching.  Ask your doctor about stool softeners.        Consider the benefits of breastfeeding.   It reduces your baby's risk of sudden infant death syndrome (SIDS).   babies are less likely to get certain infections. And they're less likely to be obese or get diabetes later in life.  It can lower your risk of breast and ovarian cancers and osteoporosis.  It saves you money.  Follow-up care is a key part of your treatment and safety. Be sure to make and go to all appointments, and call your doctor if you are having problems. It's also a good idea to know your test results and keep a list of the medicines you take.  Where can you learn more?  Go to https://www.[employer].net/patientEd and enter X711 to learn more about \"Weeks 32 to 34 of Your Pregnancy: Care Instructions.\"  Current as of: April 30, 2024  Content Version: 14.4  © 2024-2025 check24.   Care instructions adapted under license by [employer]. If you have questions about a medical condition or this instruction, always ask your healthcare professional. [employer], [employer], disclaims any warranty or liability for your use of this information.

## 2025-04-07 ENCOUNTER — ROUTINE PRENATAL (OUTPATIENT)
Age: 35
End: 2025-04-07

## 2025-04-07 VITALS
OXYGEN SATURATION: 94 % | RESPIRATION RATE: 18 BRPM | BODY MASS INDEX: 34.24 KG/M2 | SYSTOLIC BLOOD PRESSURE: 102 MMHG | TEMPERATURE: 98.5 F | DIASTOLIC BLOOD PRESSURE: 58 MMHG | WEIGHT: 239.2 LBS | HEART RATE: 83 BPM | HEIGHT: 70 IN

## 2025-04-07 DIAGNOSIS — Z34.83 PRENATAL CARE, SUBSEQUENT PREGNANCY IN THIRD TRIMESTER: Primary | ICD-10-CM

## 2025-04-07 PROCEDURE — 0502F SUBSEQUENT PRENATAL CARE: CPT | Performed by: OBSTETRICS & GYNECOLOGY

## 2025-04-18 ASSESSMENT — PATIENT HEALTH QUESTIONNAIRE - PHQ9
1. LITTLE INTEREST OR PLEASURE IN DOING THINGS: NOT AT ALL
SUM OF ALL RESPONSES TO PHQ QUESTIONS 1-9: 0
2. FEELING DOWN, DEPRESSED OR HOPELESS: NOT AT ALL
SUM OF ALL RESPONSES TO PHQ QUESTIONS 1-9: 0
SUM OF ALL RESPONSES TO PHQ9 QUESTIONS 1 & 2: 0
SUM OF ALL RESPONSES TO PHQ QUESTIONS 1-9: 0
1. LITTLE INTEREST OR PLEASURE IN DOING THINGS: NOT AT ALL
SUM OF ALL RESPONSES TO PHQ QUESTIONS 1-9: 0
2. FEELING DOWN, DEPRESSED OR HOPELESS: NOT AT ALL

## 2025-04-21 ENCOUNTER — ROUTINE PRENATAL (OUTPATIENT)
Age: 35
End: 2025-04-21

## 2025-04-21 VITALS
WEIGHT: 243 LBS | RESPIRATION RATE: 18 BRPM | DIASTOLIC BLOOD PRESSURE: 73 MMHG | HEART RATE: 82 BPM | BODY MASS INDEX: 34.87 KG/M2 | SYSTOLIC BLOOD PRESSURE: 112 MMHG

## 2025-04-21 DIAGNOSIS — Z34.83 PRENATAL CARE, SUBSEQUENT PREGNANCY IN THIRD TRIMESTER: Primary | ICD-10-CM

## 2025-04-21 PROCEDURE — 0502F SUBSEQUENT PRENATAL CARE: CPT | Performed by: OBSTETRICS & GYNECOLOGY

## 2025-04-21 NOTE — PROGRESS NOTES
Pt doing well, see prenatal flowsheet.  Gbs today  Labor/ROM/VB/FM/Pre-eclampsia precautions discussed  Declined Exam  Birth plan scanned into chart

## 2025-04-23 ENCOUNTER — RESULTS FOLLOW-UP (OUTPATIENT)
Age: 35
End: 2025-04-23

## 2025-04-23 LAB — GP B STREP DNA SPEC QL NAA+PROBE: NEGATIVE

## 2025-05-01 ENCOUNTER — ROUTINE PRENATAL (OUTPATIENT)
Age: 35
End: 2025-05-01

## 2025-05-01 VITALS — SYSTOLIC BLOOD PRESSURE: 113 MMHG | HEART RATE: 69 BPM | DIASTOLIC BLOOD PRESSURE: 68 MMHG

## 2025-05-01 DIAGNOSIS — Z34.90 PREGNANCY, UNSPECIFIED GESTATIONAL AGE: ICD-10-CM

## 2025-05-01 DIAGNOSIS — Z34.80 PRENATAL CARE OF MULTIGRAVIDA, ANTEPARTUM: Primary | ICD-10-CM

## 2025-05-01 DIAGNOSIS — O43.193 BILOBATE PLACENTA, THIRD TRIMESTER: ICD-10-CM

## 2025-05-01 NOTE — PROCEDURES
PATIENT: REMA LAW   -  : 1990   -  DOS:2025   -  INTERPRETING PROVIDER:Anabell Medel,   Indication  ========    Previous cervical surgery, Advanced Maternal Age    Method  ======    Transabdominal ultrasound examination. View: Good view    Pregnancy  =========    Aaron pregnancy. Number of fetuses: 1    Dating  ======    LMP on: 2024  Cycle: regular cycle  GA by LMP 38 w + 0 d  EDUARDO by LMP: 5/15/2025  Previous Ultrasound on: 10/18/2024  Type of prior assessment: GA  GA at prior assessment date 10 w + 2 d  GA by previous U/S 38 w + 1 d  EDUARDO by previous Ultrasound: 2025  Ultrasound examination on: 2025  GA by U/S based upon: AC, BPD, Femur, HC  GA by U/S 37 w + 0 d  EDUARDO by U/S: 2025  Assigned: based on the LMP, selected on 2024  Assigned GA 38 w + 0 d  Assigned EDUARDO: 5/15/2025    Fetal Biometry  ============    Standard  BPD 88.9 mm 36w 0d 19% Hadlock  .9 mm -/- 92% Duke  .3 mm 37w 4d 22% Hadlock  .8 mm 36w 3d 23% Hadlock  Femur 73.8 mm 37w 5d 46% Hadlock  EFW 3,042 g 37w 0d 33% Hadlock  EFW (lb) 6 lb  EFW (oz) 11 oz  EFW by: Hadlock (BPD-HC-AC-FL)  Extended   4.3 mm  Other Structures   bpm    General Evaluation  ==============    Cardiac activity present.  bpm. Fetal movements: visualized. Presentation: Cephalic  Placenta: Placental site: left lateral, appropriate distance from the internal os  Umbilical cord: Cord vessels: 3 vessel cord. Insertion site: central  Amniotic fluid: Amount of AF: normal. MVP 6.6 cm. DOMINGUEZ 18.4 cm. Q1 6.6 cm, Q2 3.9 cm, Q3 4.0 cm, Q4 4.0 cm    Fetal Anatomy  ===========    Stomach: normal  Kidneys: normal  Bladder: normal  Wants to know fetal sex: yes    Biophysical Profile  ==============    2: Fetal breathing movements  2: Gross body movements  2: Fetal tone  2: Amniotic fluid volume  8/8 Biophysical profile score    Findings  =======    Intrauterine Aaron pregnancy at 38w 0d by clinical

## 2025-05-01 NOTE — PROGRESS NOTES
Patient was seen 5/1/2025      Please look under media to view full consult and ultrasound report in ViewPoint.         Anabell Medel MD   Maternal Fetal Medicine

## 2025-05-02 ENCOUNTER — ROUTINE PRENATAL (OUTPATIENT)
Age: 35
End: 2025-05-02

## 2025-05-02 VITALS
HEART RATE: 106 BPM | SYSTOLIC BLOOD PRESSURE: 113 MMHG | BODY MASS INDEX: 34.29 KG/M2 | DIASTOLIC BLOOD PRESSURE: 75 MMHG | WEIGHT: 239 LBS | RESPIRATION RATE: 16 BRPM

## 2025-05-02 DIAGNOSIS — Z34.83 PRENATAL CARE, SUBSEQUENT PREGNANCY IN THIRD TRIMESTER: Primary | ICD-10-CM

## 2025-05-02 PROCEDURE — 0502F SUBSEQUENT PRENATAL CARE: CPT | Performed by: OBSTETRICS & GYNECOLOGY

## 2025-05-02 NOTE — PROGRESS NOTES
Pt doing well, see prenatal flowsheet.  Declined exam.  Discussed she declined  testing, agrees to have BPP if still pregnant at 41 weeks.    Requests iol at 41.5 weeks (declines earlier).    Labor/ROM/VB/FM/Pre-eclampsia precautions discussed

## 2025-05-09 ENCOUNTER — ROUTINE PRENATAL (OUTPATIENT)
Age: 35
End: 2025-05-09

## 2025-05-09 VITALS
DIASTOLIC BLOOD PRESSURE: 68 MMHG | SYSTOLIC BLOOD PRESSURE: 100 MMHG | BODY MASS INDEX: 34.67 KG/M2 | HEART RATE: 76 BPM | WEIGHT: 241.6 LBS

## 2025-05-09 DIAGNOSIS — Z34.83 PRENATAL CARE, SUBSEQUENT PREGNANCY IN THIRD TRIMESTER: Primary | ICD-10-CM

## 2025-05-09 PROCEDURE — 0502F SUBSEQUENT PRENATAL CARE: CPT | Performed by: OBSTETRICS & GYNECOLOGY

## 2025-05-16 ENCOUNTER — ROUTINE PRENATAL (OUTPATIENT)
Age: 35
End: 2025-05-16

## 2025-05-16 VITALS
WEIGHT: 246 LBS | SYSTOLIC BLOOD PRESSURE: 125 MMHG | DIASTOLIC BLOOD PRESSURE: 82 MMHG | HEART RATE: 61 BPM | BODY MASS INDEX: 35.3 KG/M2 | RESPIRATION RATE: 16 BRPM

## 2025-05-16 DIAGNOSIS — Z34.83 PRENATAL CARE, SUBSEQUENT PREGNANCY IN THIRD TRIMESTER: Primary | ICD-10-CM

## 2025-05-16 PROCEDURE — 0502F SUBSEQUENT PRENATAL CARE: CPT | Performed by: OBSTETRICS & GYNECOLOGY

## 2025-05-16 NOTE — PROGRESS NOTES
Pt doing well, see prenatal flowsheet.  Labor/ROM/VB/FM/Pre-eclampsia precautions discussed  IOL scheduled 41+ weeks, agrees to BPP at 41 weeks.  Declined earlier ultrasound or IOL.

## 2025-05-17 ENCOUNTER — HOSPITAL ENCOUNTER (INPATIENT)
Facility: HOSPITAL | Age: 35
LOS: 2 days | Discharge: HOME | End: 2025-05-19
Attending: OBSTETRICS & GYNECOLOGY | Admitting: OBSTETRICS & GYNECOLOGY
Payer: COMMERCIAL

## 2025-05-17 PROBLEM — Z37.9 NORMAL LABOR: Status: ACTIVE | Noted: 2025-05-17

## 2025-05-17 PROCEDURE — G0378 HOSPITAL OBSERVATION PER HR: HCPCS

## 2025-05-17 PROCEDURE — 59400 OBSTETRICAL CARE: CPT | Performed by: OBSTETRICS & GYNECOLOGY

## 2025-05-17 PROCEDURE — 1120000000 HC RM PRIVATE OB

## 2025-05-17 PROCEDURE — 7220000101 HC DELIVERY VAGINAL/SINGLE

## 2025-05-17 PROCEDURE — G0379 DIRECT REFER HOSPITAL OBSERV: HCPCS

## 2025-05-17 RX ORDER — SODIUM CHLORIDE, SODIUM LACTATE, POTASSIUM CHLORIDE, CALCIUM CHLORIDE 600; 310; 30; 20 MG/100ML; MG/100ML; MG/100ML; MG/100ML
INJECTION, SOLUTION INTRAVENOUS CONTINUOUS
Status: DISCONTINUED | OUTPATIENT
Start: 2025-05-18 | End: 2025-05-19 | Stop reason: HOSPADM

## 2025-05-17 RX ORDER — SODIUM CHLORIDE, SODIUM LACTATE, POTASSIUM CHLORIDE, AND CALCIUM CHLORIDE .6; .31; .03; .02 G/100ML; G/100ML; G/100ML; G/100ML
500 INJECTION, SOLUTION INTRAVENOUS PRN
Status: DISCONTINUED | OUTPATIENT
Start: 2025-05-17 | End: 2025-05-19 | Stop reason: HOSPADM

## 2025-05-17 RX ORDER — ONDANSETRON 4 MG/1
4 TABLET, ORALLY DISINTEGRATING ORAL EVERY 6 HOURS PRN
Status: DISCONTINUED | OUTPATIENT
Start: 2025-05-17 | End: 2025-05-19 | Stop reason: HOSPADM

## 2025-05-17 RX ORDER — TRANEXAMIC ACID 10 MG/ML
1000 INJECTION, SOLUTION INTRAVENOUS
Status: DISCONTINUED | OUTPATIENT
Start: 2025-05-17 | End: 2025-05-19 | Stop reason: HOSPADM

## 2025-05-17 RX ORDER — MISOPROSTOL 200 UG/1
400 TABLET ORAL PRN
Status: DISCONTINUED | OUTPATIENT
Start: 2025-05-17 | End: 2025-05-19 | Stop reason: HOSPADM

## 2025-05-17 RX ORDER — METHYLERGONOVINE MALEATE 0.2 MG/ML
200 INJECTION INTRAVENOUS PRN
Status: DISCONTINUED | OUTPATIENT
Start: 2025-05-17 | End: 2025-05-19 | Stop reason: HOSPADM

## 2025-05-17 RX ORDER — TERBUTALINE SULFATE 1 MG/ML
0.25 INJECTION SUBCUTANEOUS
Status: DISCONTINUED | OUTPATIENT
Start: 2025-05-17 | End: 2025-05-17

## 2025-05-17 RX ORDER — SODIUM CHLORIDE 0.9 % (FLUSH) 0.9 %
5-40 SYRINGE (ML) INJECTION EVERY 12 HOURS SCHEDULED
Status: DISCONTINUED | OUTPATIENT
Start: 2025-05-17 | End: 2025-05-17

## 2025-05-17 RX ORDER — CARBOPROST TROMETHAMINE 250 UG/ML
250 INJECTION, SOLUTION INTRAMUSCULAR PRN
Status: DISCONTINUED | OUTPATIENT
Start: 2025-05-17 | End: 2025-05-19 | Stop reason: HOSPADM

## 2025-05-17 RX ORDER — OXYTOCIN 10 [USP'U]/ML
INJECTION, SOLUTION INTRAMUSCULAR; INTRAVENOUS
Status: DISCONTINUED
Start: 2025-05-17 | End: 2025-05-17

## 2025-05-17 RX ORDER — SODIUM CHLORIDE 9 MG/ML
25 INJECTION, SOLUTION INTRAVENOUS PRN
Status: DISCONTINUED | OUTPATIENT
Start: 2025-05-17 | End: 2025-05-17

## 2025-05-17 RX ORDER — ONDANSETRON 2 MG/ML
4 INJECTION INTRAMUSCULAR; INTRAVENOUS EVERY 6 HOURS PRN
Status: DISCONTINUED | OUTPATIENT
Start: 2025-05-17 | End: 2025-05-19 | Stop reason: HOSPADM

## 2025-05-17 RX ORDER — SODIUM CHLORIDE 0.9 % (FLUSH) 0.9 %
5-40 SYRINGE (ML) INJECTION PRN
Status: DISCONTINUED | OUTPATIENT
Start: 2025-05-17 | End: 2025-05-17

## 2025-05-17 RX ORDER — ACETAMINOPHEN 325 MG/1
650 TABLET ORAL EVERY 4 HOURS PRN
Status: DISCONTINUED | OUTPATIENT
Start: 2025-05-17 | End: 2025-05-17

## 2025-05-18 PROCEDURE — 6370000000 HC RX 637 (ALT 250 FOR IP): Performed by: OBSTETRICS & GYNECOLOGY

## 2025-05-18 PROCEDURE — 1120000000 HC RM PRIVATE OB

## 2025-05-18 PROCEDURE — 94761 N-INVAS EAR/PLS OXIMETRY MLT: CPT

## 2025-05-18 PROCEDURE — 6370000000 HC RX 637 (ALT 250 FOR IP): Performed by: ADVANCED PRACTICE MIDWIFE

## 2025-05-18 RX ORDER — ONDANSETRON 2 MG/ML
4 INJECTION INTRAMUSCULAR; INTRAVENOUS EVERY 6 HOURS PRN
Status: DISCONTINUED | OUTPATIENT
Start: 2025-05-18 | End: 2025-05-19 | Stop reason: HOSPADM

## 2025-05-18 RX ORDER — ACETAMINOPHEN 500 MG
1000 TABLET ORAL EVERY 8 HOURS SCHEDULED
Status: DISCONTINUED | OUTPATIENT
Start: 2025-05-18 | End: 2025-05-19 | Stop reason: HOSPADM

## 2025-05-18 RX ORDER — IBUPROFEN 200 MG
400 TABLET ORAL EVERY 4 HOURS PRN
Status: DISCONTINUED | OUTPATIENT
Start: 2025-05-18 | End: 2025-05-19 | Stop reason: HOSPADM

## 2025-05-18 RX ORDER — ACETAMINOPHEN 500 MG
1000 TABLET ORAL EVERY 8 HOURS SCHEDULED
Status: DISCONTINUED | OUTPATIENT
Start: 2025-05-18 | End: 2025-05-18

## 2025-05-18 RX ORDER — DOCUSATE SODIUM 100 MG/1
100 CAPSULE, LIQUID FILLED ORAL 2 TIMES DAILY
Status: DISCONTINUED | OUTPATIENT
Start: 2025-05-18 | End: 2025-05-19 | Stop reason: HOSPADM

## 2025-05-18 RX ORDER — SODIUM CHLORIDE 9 MG/ML
INJECTION, SOLUTION INTRAVENOUS PRN
Status: DISCONTINUED | OUTPATIENT
Start: 2025-05-18 | End: 2025-05-19 | Stop reason: HOSPADM

## 2025-05-18 RX ORDER — ONDANSETRON 4 MG/1
4 TABLET, ORALLY DISINTEGRATING ORAL EVERY 6 HOURS PRN
Status: DISCONTINUED | OUTPATIENT
Start: 2025-05-18 | End: 2025-05-19 | Stop reason: HOSPADM

## 2025-05-18 RX ORDER — IBUPROFEN 800 MG/1
800 TABLET, FILM COATED ORAL EVERY 8 HOURS SCHEDULED
Status: DISCONTINUED | OUTPATIENT
Start: 2025-05-18 | End: 2025-05-19 | Stop reason: HOSPADM

## 2025-05-18 RX ORDER — SODIUM CHLORIDE 0.9 % (FLUSH) 0.9 %
5-40 SYRINGE (ML) INJECTION EVERY 12 HOURS SCHEDULED
Status: DISCONTINUED | OUTPATIENT
Start: 2025-05-18 | End: 2025-05-19 | Stop reason: HOSPADM

## 2025-05-18 RX ORDER — SODIUM CHLORIDE 0.9 % (FLUSH) 0.9 %
5-40 SYRINGE (ML) INJECTION PRN
Status: DISCONTINUED | OUTPATIENT
Start: 2025-05-18 | End: 2025-05-19 | Stop reason: HOSPADM

## 2025-05-18 RX ORDER — IBUPROFEN 800 MG/1
800 TABLET, FILM COATED ORAL EVERY 8 HOURS PRN
Qty: 60 TABLET | Refills: 0 | Status: SHIPPED | OUTPATIENT
Start: 2025-05-18

## 2025-05-18 RX ADMIN — ACETAMINOPHEN 1000 MG: 500 TABLET ORAL at 18:15

## 2025-05-18 RX ADMIN — ACETAMINOPHEN 1000 MG: 500 TABLET ORAL at 01:56

## 2025-05-18 RX ADMIN — ACETAMINOPHEN 1000 MG: 500 TABLET ORAL at 09:28

## 2025-05-18 RX ADMIN — DOCUSATE SODIUM 100 MG: 100 CAPSULE, LIQUID FILLED ORAL at 09:28

## 2025-05-18 RX ADMIN — IBUPROFEN 800 MG: 800 TABLET, FILM COATED ORAL at 15:44

## 2025-05-18 RX ADMIN — DOCUSATE SODIUM 100 MG: 100 CAPSULE, LIQUID FILLED ORAL at 21:21

## 2025-05-18 RX ADMIN — IBUPROFEN 800 MG: 800 TABLET, FILM COATED ORAL at 07:00

## 2025-05-18 ASSESSMENT — PAIN SCALES - GENERAL
PAINLEVEL_OUTOF10: 6
PAINLEVEL_OUTOF10: 6
PAINLEVEL_OUTOF10: 4
PAINLEVEL_OUTOF10: 2
PAINLEVEL_OUTOF10: 7

## 2025-05-18 ASSESSMENT — PAIN DESCRIPTION - LOCATION
LOCATION: ABDOMEN
LOCATION: ABDOMEN;PERINEUM
LOCATION: PERINEUM

## 2025-05-18 ASSESSMENT — PAIN DESCRIPTION - DESCRIPTORS
DESCRIPTORS: SORE
DESCRIPTORS: SORE
DESCRIPTORS: CRAMPING;SORE
DESCRIPTORS: CRAMPING
DESCRIPTORS: CRAMPING

## 2025-05-18 ASSESSMENT — PAIN DESCRIPTION - ORIENTATION
ORIENTATION: LOWER

## 2025-05-18 ASSESSMENT — PAIN - FUNCTIONAL ASSESSMENT
PAIN_FUNCTIONAL_ASSESSMENT: ACTIVITIES ARE NOT PREVENTED
PAIN_FUNCTIONAL_ASSESSMENT: ACTIVITIES ARE NOT PREVENTED

## 2025-05-18 NOTE — H&P
History & Physical    Name: Jesse Schroeder MRN: [id number]  SSN: [id number]    YOB: 1990  Age: 34 y.o.  Sex: female        Subjective:     Estimated Date of Delivery: 5/15/25  OB History          2    Para   1    Term   1       0    AB   0    Living   1         SAB   0    IAB   0    Ectopic   0    Molar   0    Multiple   0    Live Births   1                Ms. Schroeder is admitted with pregnancy at 40w2d with urge to push. Prenatal course was normal. Please see prenatal records for details.    Past Medical History:   Diagnosis Date    Abnormal Pap smear of cervix     + HPV and mild dysplasia. Tx w/ LEEP.  q 6month pap x2 both negative.    COVID 2022    Encounter for insertion of Mirena IUD 2016;Replaced 16 -> removed 22.    HPV vaccine counseling     Gardasil Series Completed     Routine Papanicolaou smear 10/05/2023    normal HPV neg    Vitamin D deficiency      Past Surgical History:   Procedure Laterality Date    GYN  2011    Mirena IUD Inserted     LEEP  Summer 2009    Mild dysplasia and HPV +     Social History     Occupational History    Not on file   Tobacco Use    Smoking status: Former     Current packs/day: 0.00     Types: Cigarettes    Smokeless tobacco: Never   Vaping Use    Vaping status: Every Day    Substances: Nicotine   Substance and Sexual Activity    Alcohol use: Not Currently     Alcohol/week: 1.0 standard drink of alcohol     Types: 1 Glasses of wine per week     Comment: 1-2 drinks weekly    Drug use: Not Currently     Types: Marijuana (Weed)    Sexual activity: Yes     Partners: Male     Birth control/protection: None     Comment: Single partner - Spouse     Family History   Problem Relation Age of Onset    Diabetes Father     Heart Attack Father         Fatal     Hypertension Father        Allergies   Allergen Reactions    Amoxicillin Itching     Prior to Admission medications    Medication Sig Start Date End Date Taking?

## 2025-05-18 NOTE — DISCHARGE SUMMARY
Obstetrical Discharge Summary     Name: Jesse Schroeder MRN: [id number]  SSN: [id number]    YOB: 1990  Age: 34 y.o.  Sex: female      Admit Date: 2025    Discharge Date: 2025    Admitting Physician: Jason Aguirre Jr., MD     Attending Physician:  Nat Gonzalez MD     * Admission Diagnoses: Normal labor [O80, Z37.9]    * Discharge Diagnoses:   Information for the patient's :  Kendra Schroeder [[id number]]   Vaginal, Spontaneous     Additional Diagnoses:    Lab Results   Component Value Date/Time    ABORH O POSITIVE 06/10/2023 05:38 AM      Immunization History   Administered Date(s) Administered    TDaP, ADACEL (age 10y-64y), BOOSTRIX (age 10y+), IM, 0.5mL 2023, 2025       * Procedures:   Delivery Type: Vaginal, Spontaneous     Delivering Clinician:TRUNG GARRISON   Delivery Date /Time: 2025 10:38 PM '          * Discharge Condition: stable    * Hospital Course: Normal hospital course following the delivery.    * Disposition: home with office follow-up    Discharge Medications:      Medication List        START taking these medications      ibuprofen 800 MG tablet  Commonly known as: ADVIL;MOTRIN  Take 1 tablet by mouth every 8 hours as needed for Pain            CONTINUE taking these medications      LORATADINE PO     PNV PO     VITAMIN D BOOSTER PO               Where to Get Your Medications        These medications were sent to [email or web address] include 234 beds at the [facility]  [address] - P [phone] - F [phone]   [facility] [address]      Phone: [phone]   ibuprofen 800 MG tablet         * Follow-up Care/Patient Instructions:  Activity: activity as tolerated  Diet: general  Wound Care: as directed    Patient to schedule follow-up in 6 weeks    Signed By:  Nat Gonzalez MD     May 18, 2025

## 2025-05-18 NOTE — PROGRESS NOTES
Post-Partum Day Number 1 Progress Note    Jesse Schroeder       Information for the patient's :  Alexandre, Female Jesse [[id number]]   Vaginal, Spontaneous Patient doing well without significant complaint.  Voiding without difficulty, normal lochia.     Vitals:  /71   Pulse 76   Temp 97.7 °F (36.5 °C) (Oral)   Resp 16   LMP 2024 (Exact Date)   SpO2 97%   Breastfeeding Unknown   Temp (24hrs), Av.8 °F (36.6 °C), Min:97.7 °F (36.5 °C), Max:98.1 °F (36.7 °C)      Exam:    Patient without distress.  Abdomen soft, fundus firm at level of umbilicus, nontender.    Lower extremities are negative for swelling, cords or tenderness.    Labs:     No results for input(s): \"WBC\", \"HGB\", \"PLT\", \"NA\", \"K\", \"CL\", \"CO2\", \"BUN\", \"CREATININE\", \"GLUCOSE\", \"ALT\", \"AST\", \"ALKPHOS\" in the last 72 hours.    Assessment: Doing well, post partum day 1.     Plan:  Continue routine postpartum and perineal care as well as maternal education.

## 2025-05-18 NOTE — L&D DELIVERY NOTE
25 1038 - 25 2309         Intrapartum & Postpartum Delivery Admission    None                  End of Mother's Information  Mother: Jesse Schroeder R \"Yamilet\" #[id number]                Delivery Providers    Delivering clinician: Cat Iyer APRN - CNM     Provider Role     Obstetrician    Carol York, RN Primary Nurse    Margarita Villar RN Charge Nurse    Amelia Thomas RN Staff Nurse     Neonatologist     Anesthesiologist     Nurse Anesthetist     Nurse Practitioner    Cat Iyer APRN - CNM Midwife    Rima Fitch, RN Nursery Nurse    Shari Powers RN Nursery Nurse    Rdoney Ricks Scrub Tech     Assistant Surgeon               Assessment    Living Status: Living        Skin Color:   Heart Rate:   Reflex Irritability:   Muscle Tone:   Respiratory Effort:   Total:            1 Minute:    1    2    2    2    2    9         5 Minute:    1    2    2    2    2    9                                        Apgars Assigned By: ZAIN POWERS              Resuscitation    Method: Bulb Suction, Room Air, Suctioning              Measurements               Skin to Skin      Skin to Skin Initiation Date/Time: 25 22:40:00 EDT     Skin to Skin With: Mother

## 2025-05-18 NOTE — DISCHARGE INSTRUCTIONS
POST DELIVERY DISCHARGE INSTRUCTIONS    Name: Jesse Schroeder  YOB: 1990  Primary Diagnosis: [unfilled]    General:     Diet/Diet Restrictions:  Eight 8-ounce glasses of fluid daily (water, juices); avoid excessive caffeine intake.  Meals/snacks as desired which are high in fiber and carbohydrates and low in fat and cholesterol.    Medications:   {Medication reconciliation information is now added to the patient's AVS automatically when it is printed.  There is no need to use this SmartLink in discharge instructions.  Highlight this text and delete it to clear this message}      Physical Activity / Restrictions / Safety:     Avoid heavy lifting, no more that 8 lbs. For 2-3 weeks; No driving while taking narcotic pain medication. Post  patients should not drive until pain free.  No intercourse 4-6 weeks, no douching or tampon use. May resume exercise in 6 weeks.         Discharge Instructions/Special Treatment/Home Care Needs:     Continue prenatal vitamins.  Continue to use squirt bottle with warm water on your episiotomy after each bathroom use until bleeding stops.  If steri-strips applied to your incision, remove in 7 days.  Take stool softeners daily.    Call your doctor for the following:     Fever over 101 degrees by mouth.  Vaginal bleeding heavier than a normal menstrual period or lost larger than a golf ball.  Red streaks or increased swelling of legs, painful red streaks on your breast.  Painful urination, or increased pain, redness or discharge with your incision.    Pain Management:     Pain Management:   Take Acetaminophen (Tylenol) or Ibuprofen (Advil, Motrin), as directed for pain. Use a warm Sitz bath 3 times daily to relieve episiotomy or hemorrhoidal discomfort. Heating pad to  incision as needed. For hemorrhoidal discomfort, use Tucks and Anusol cream as needed and directed.    Follow-Up Care:     Pt to scheduled follow-up appt in 6 weeks    Telephone number:

## 2025-05-18 NOTE — PROGRESS NOTES
2205: Patient arrived to unit making rhythmic verbalizations, reports her water broke at approximately 2115 this evening and she has been michael on and off since yesterday.  Patient requesting as low intervention birth as possible, declines IV, VS and allowing intermittent monitoring. george Jackman accompanying patient.   2215: Kajal GANNON CNM at bedside for assessment, patient feels \"like I could push\".  Pt in hands and knees on bed. Intermittent monitoring throughout triage.   2220:Pt beginning to bear down, SVE preformed by CNM per t request and pt complete.   2238:  viable female infant,   2245: breastfeeding initiated   2251: placenta spontaneously out with gentle push  2315: Patient requesting ibuprofen.   0100: SBAR report given

## 2025-05-19 VITALS
HEART RATE: 72 BPM | TEMPERATURE: 97.7 F | SYSTOLIC BLOOD PRESSURE: 128 MMHG | OXYGEN SATURATION: 99 % | DIASTOLIC BLOOD PRESSURE: 85 MMHG | RESPIRATION RATE: 15 BRPM

## 2025-05-19 PROCEDURE — 6370000000 HC RX 637 (ALT 250 FOR IP): Performed by: OBSTETRICS & GYNECOLOGY

## 2025-05-19 PROCEDURE — 6370000000 HC RX 637 (ALT 250 FOR IP): Performed by: ADVANCED PRACTICE MIDWIFE

## 2025-05-19 RX ADMIN — ACETAMINOPHEN 1000 MG: 500 TABLET ORAL at 08:09

## 2025-05-19 RX ADMIN — IBUPROFEN 800 MG: 800 TABLET, FILM COATED ORAL at 05:24

## 2025-05-19 RX ADMIN — DOCUSATE SODIUM 100 MG: 100 CAPSULE, LIQUID FILLED ORAL at 08:10

## 2025-05-19 ASSESSMENT — PAIN - FUNCTIONAL ASSESSMENT: PAIN_FUNCTIONAL_ASSESSMENT: ACTIVITIES ARE NOT PREVENTED

## 2025-05-19 ASSESSMENT — PAIN SCALES - GENERAL
PAINLEVEL_OUTOF10: 2
PAINLEVEL_OUTOF10: 3

## 2025-05-19 ASSESSMENT — PAIN DESCRIPTION - DESCRIPTORS: DESCRIPTORS: CRAMPING

## 2025-05-19 ASSESSMENT — PAIN DESCRIPTION - LOCATION: LOCATION: BREAST

## 2025-05-19 ASSESSMENT — PAIN DESCRIPTION - ORIENTATION: ORIENTATION: ANTERIOR

## 2025-05-19 NOTE — PROGRESS NOTES
Post-Partum Day Number 2 Progress Note    Malujolene Schroeder     Information for the patient's :  Alexandre, Female Jesse [[id number]]   Vaginal, Spontaneous Patient doing well without significant complaint.  Voiding without difficulty, normal lochia.    Vitals:  /85   Pulse 72   Temp 97.7 °F (36.5 °C) (Oral)   Resp 15   LMP 2024 (Exact Date)   SpO2 99%   Breastfeeding Unknown   Temp (24hrs), Av.9 °F (36.6 °C), Min:97.7 °F (36.5 °C), Max:98.1 °F (36.7 °C)      Exam:    Patient without distress.  Abdomen soft, fundus firm at level of umbilicus, nontender.    Lower extremities are negative for swelling, cords or tenderness.    Assessment: Doing well, post partum day 2    Plan:   1. Discharge home today  2. Follow up in office in 6 weeks  3. Post partum activity advised, diet as tolerated

## 2025-05-19 NOTE — LACTATION NOTE
This note was copied from a baby's chart.  Rounding for lactation consult. Registration at bedside.  Will follow.   
  or wake every 3h, will obtain deep latch, and will keep log of feedings/output.  Taught to BF at hunger cues and or q 2-3 hrs and to offer 10-20 drops of hand expressed colostrum at any non-feeds.      Left Breast: Soft  Left Nipple: Protrude with stimulation  Right Nipple: Protrude with stimulation  Right Breast: Soft  Position and Latch: Independently  Signs of Transfer: Mom reports sleepy feeling  Maternal Response: Attentive  Infant Supplementation: Expressed Breast Milk, Bottle, Per Mother Request       Breast Care: Using breast pump  Care Plan Initiated: Latch problems

## 2025-06-03 ENCOUNTER — TELEPHONE (OUTPATIENT)
Age: 35
End: 2025-06-03

## 2025-06-03 RX ORDER — IBUPROFEN 800 MG/1
800 TABLET, FILM COATED ORAL EVERY 8 HOURS PRN
Qty: 60 TABLET | Refills: 0 | Status: SHIPPED | OUTPATIENT
Start: 2025-06-03

## 2025-06-03 NOTE — TELEPHONE ENCOUNTER
34 year old patient delivered on 2025     Requested prescription last sent on 2025    Please review and advise regarding pended refill     Thank you  
Prescription refill reviewed and signed by MD  
signed  
none

## 2025-06-18 RX ORDER — IBUPROFEN 800 MG/1
800 TABLET, FILM COATED ORAL EVERY 8 HOURS PRN
Qty: 60 TABLET | Refills: 0 | OUTPATIENT
Start: 2025-06-18

## 2025-06-23 NOTE — PROGRESS NOTES
Jesse Schroeder is a 34 y.o. female returns for a routine post-partum follow-up visit     No chief complaint on file.      Postpartum Depression: Low Risk  (2025)    [place]  Depression Scale     Last EPDS Total Score: 4     Last EPDS Self Harm Result: Never         Type of delivery: normal spontaneous vaginal delivery  Date of Delivery: May 17, 2025  Breastfeeding: yes  Bleeding Resolved: yes  Birth Control: none.  Last Pap: normal obtained 1 year(s) ago.        Problems: no problems    1. Have you been to the ER, urgent care clinic, or hospitalized since your last visit? Yes Childcare    2. Have you seen or consulted any other health care providers outside of the [facility] since your last visit? No    Examination chaperoned by Teofilo Crouch LPN.

## 2025-06-24 ENCOUNTER — POSTPARTUM VISIT (OUTPATIENT)
Age: 35
End: 2025-06-24

## 2025-06-24 VITALS
HEART RATE: 83 BPM | OXYGEN SATURATION: 98 % | WEIGHT: 228.4 LBS | BODY MASS INDEX: 32.7 KG/M2 | HEIGHT: 70 IN | DIASTOLIC BLOOD PRESSURE: 62 MMHG | SYSTOLIC BLOOD PRESSURE: 95 MMHG

## 2025-06-24 DIAGNOSIS — Z34.83 PRENATAL CARE, SUBSEQUENT PREGNANCY IN THIRD TRIMESTER: Primary | ICD-10-CM

## 2025-06-24 PROCEDURE — 0503F POSTPARTUM CARE VISIT: CPT | Performed by: OBSTETRICS & GYNECOLOGY

## 2025-06-24 RX ORDER — ACETAMINOPHEN AND CODEINE PHOSPHATE 120; 12 MG/5ML; MG/5ML
1 SOLUTION ORAL DAILY
Qty: 90 TABLET | Refills: 4 | Status: SHIPPED | OUTPATIENT
Start: 2025-06-24

## 2025-06-24 RX ORDER — HYDROCORTISONE ACETATE 25 MG/1
25 SUPPOSITORY RECTAL EVERY 12 HOURS
Qty: 20 SUPPOSITORY | Refills: 1 | Status: SHIPPED | OUTPATIENT
Start: 2025-06-24

## 2025-06-24 NOTE — PROGRESS NOTES
Postpartum evaluation    Jesse Schroeder is a 35 y.o. female who presents for a postpartum exam.     Her baby is doing well.    She has had the following significant problems since her delivery: none    Per Nursing Note:  Type of delivery: normal spontaneous vaginal delivery  Date of Delivery: May 17, 2025  Breastfeeding: yes  Bleeding Resolved: yes  Birth Control: none.  Last Pap: normal obtained 1 year(s) ago.    BP 95/62   Pulse 83   Ht 1.778 m (5' 10\")   Wt 103.6 kg (228 lb 6.4 oz)   LMP 08/08/2024 (Exact Date)   SpO2 98%   Breastfeeding Yes   BMI 32.77 kg/m²     PHYSICAL EXAMINATION    Constitutional  Appearance: well-nourished, well developed, alert, in no acute distress    HENT  Head and Face: appears normal    Neck  Inspection/Palpation: normal appearance, no masses or tenderness  Lymph Nodes: no lymphadenopathy present  Thyroid: gland size normal, nontender, no nodules or masses present on palpation    Breasts  Inspection of Breasts: breasts symmetrical, no skin changes, no discharge present, nipple appearance normal, no skin retraction present  Palpation of Breasts and Axillae: no masses present on palpation, no breast tenderness  Axillary Lymph Nodes: no lymphadenopathy present    Gastrointestinal  Abdominal Examination: abdomen non-tender to palpation, normal bowel sounds, no masses present  Liver and spleen: no hepatomegaly present, spleen not palpable  Hernias: no hernias identified    Skin  General Inspection: no rash, no lesions identified    Neurologic/Psychiatric  Mental Status:  Orientation: grossly oriented to person, place and time  Mood and Affect: mood normal, affect appropriate    Assessment:  Normal postpartum check    Plan:  RTO for AE.        Please note that this dictation was completed with [employer], the [employer] voice recognition software.  Quite often unanticipated grammatical, syntax, homophones, and other interpretive errors are inadvertently transcribed by the computer